# Patient Record
Sex: MALE | Employment: OTHER | URBAN - METROPOLITAN AREA
[De-identification: names, ages, dates, MRNs, and addresses within clinical notes are randomized per-mention and may not be internally consistent; named-entity substitution may affect disease eponyms.]

---

## 2019-04-23 ENCOUNTER — APPOINTMENT (OUTPATIENT)
Dept: CT IMAGING | Age: 84
DRG: 682 | End: 2019-04-23
Attending: EMERGENCY MEDICINE
Payer: MEDICARE

## 2019-04-23 ENCOUNTER — APPOINTMENT (OUTPATIENT)
Dept: GENERAL RADIOLOGY | Age: 84
DRG: 682 | End: 2019-04-23
Attending: FAMILY MEDICINE
Payer: MEDICARE

## 2019-04-23 ENCOUNTER — HOSPITAL ENCOUNTER (INPATIENT)
Age: 84
LOS: 1 days | Discharge: HOME | DRG: 682 | End: 2019-04-25
Attending: EMERGENCY MEDICINE | Admitting: FAMILY MEDICINE
Payer: MEDICARE

## 2019-04-23 DIAGNOSIS — Z72.0 TOBACCO USE: ICD-10-CM

## 2019-04-23 DIAGNOSIS — T07.XXXA MULTIPLE CONTUSIONS: Primary | ICD-10-CM

## 2019-04-23 DIAGNOSIS — Z71.89 GOALS OF CARE, COUNSELING/DISCUSSION: ICD-10-CM

## 2019-04-23 DIAGNOSIS — N17.9 AKI (ACUTE KIDNEY INJURY) (HCC): ICD-10-CM

## 2019-04-23 DIAGNOSIS — N39.0 URINARY TRACT INFECTION WITHOUT HEMATURIA, SITE UNSPECIFIED: ICD-10-CM

## 2019-04-23 DIAGNOSIS — R41.0 CONFUSION: ICD-10-CM

## 2019-04-23 DIAGNOSIS — R41.89 COGNITIVE DECLINE: ICD-10-CM

## 2019-04-23 DIAGNOSIS — G93.40 ACUTE ENCEPHALOPATHY: ICD-10-CM

## 2019-04-23 LAB
ALBUMIN SERPL-MCNC: 3.4 G/DL (ref 3.5–5)
ALBUMIN/GLOB SERPL: 0.9 {RATIO} (ref 1.1–2.2)
ALP SERPL-CCNC: 116 U/L (ref 45–117)
ALT SERPL-CCNC: 19 U/L (ref 12–78)
ANION GAP SERPL CALC-SCNC: 5 MMOL/L (ref 5–15)
APPEARANCE UR: ABNORMAL
AST SERPL-CCNC: 22 U/L (ref 15–37)
BACTERIA URNS QL MICRO: NEGATIVE /HPF
BASOPHILS # BLD: 0.1 K/UL (ref 0–0.1)
BASOPHILS NFR BLD: 1 % (ref 0–1)
BILIRUB SERPL-MCNC: 0.4 MG/DL (ref 0.2–1)
BILIRUB UR QL: NEGATIVE
BUN SERPL-MCNC: 56 MG/DL (ref 6–20)
BUN/CREAT SERPL: 31 (ref 12–20)
CALCIUM SERPL-MCNC: 8.9 MG/DL (ref 8.5–10.1)
CHLORIDE SERPL-SCNC: 107 MMOL/L (ref 97–108)
CO2 SERPL-SCNC: 25 MMOL/L (ref 21–32)
COLOR UR: ABNORMAL
COMMENT, HOLDF: NORMAL
CREAT SERPL-MCNC: 1.79 MG/DL (ref 0.7–1.3)
DIFFERENTIAL METHOD BLD: ABNORMAL
EOSINOPHIL # BLD: 0.2 K/UL (ref 0–0.4)
EOSINOPHIL NFR BLD: 2 % (ref 0–7)
EPITH CASTS URNS QL MICRO: ABNORMAL /LPF
ERYTHROCYTE [DISTWIDTH] IN BLOOD BY AUTOMATED COUNT: 13.8 % (ref 11.5–14.5)
GLOBULIN SER CALC-MCNC: 4 G/DL (ref 2–4)
GLUCOSE SERPL-MCNC: 88 MG/DL (ref 65–100)
GLUCOSE UR STRIP.AUTO-MCNC: NEGATIVE MG/DL
HCT VFR BLD AUTO: 38 % (ref 36.6–50.3)
HGB BLD-MCNC: 12.1 G/DL (ref 12.1–17)
HGB UR QL STRIP: ABNORMAL
IMM GRANULOCYTES # BLD AUTO: 0 K/UL (ref 0–0.04)
IMM GRANULOCYTES NFR BLD AUTO: 0 % (ref 0–0.5)
KETONES UR QL STRIP.AUTO: NEGATIVE MG/DL
LEUKOCYTE ESTERASE UR QL STRIP.AUTO: ABNORMAL
LYMPHOCYTES # BLD: 1.6 K/UL (ref 0.8–3.5)
LYMPHOCYTES NFR BLD: 17 % (ref 12–49)
MCH RBC QN AUTO: 32 PG (ref 26–34)
MCHC RBC AUTO-ENTMCNC: 31.8 G/DL (ref 30–36.5)
MCV RBC AUTO: 100.5 FL (ref 80–99)
MONOCYTES # BLD: 0.9 K/UL (ref 0–1)
MONOCYTES NFR BLD: 9 % (ref 5–13)
NEUTS SEG # BLD: 7 K/UL (ref 1.8–8)
NEUTS SEG NFR BLD: 71 % (ref 32–75)
NITRITE UR QL STRIP.AUTO: NEGATIVE
NRBC # BLD: 0 K/UL (ref 0–0.01)
NRBC BLD-RTO: 0 PER 100 WBC
OTHER,OTHU: ABNORMAL
PH UR STRIP: 6 [PH] (ref 5–8)
PLATELET # BLD AUTO: 224 K/UL (ref 150–400)
PMV BLD AUTO: 10.5 FL (ref 8.9–12.9)
POTASSIUM SERPL-SCNC: 5.2 MMOL/L (ref 3.5–5.1)
PROT SERPL-MCNC: 7.4 G/DL (ref 6.4–8.2)
PROT UR STRIP-MCNC: 30 MG/DL
RBC # BLD AUTO: 3.78 M/UL (ref 4.1–5.7)
RBC #/AREA URNS HPF: ABNORMAL /HPF (ref 0–5)
SAMPLES BEING HELD,HOLD: NORMAL
SODIUM SERPL-SCNC: 137 MMOL/L (ref 136–145)
SP GR UR REFRACTOMETRY: 1.01 (ref 1–1.03)
UR CULT HOLD, URHOLD: NORMAL
UROBILINOGEN UR QL STRIP.AUTO: 0.2 EU/DL (ref 0.2–1)
WBC # BLD AUTO: 9.8 K/UL (ref 4.1–11.1)
WBC URNS QL MICRO: >100 /HPF (ref 0–4)

## 2019-04-23 PROCEDURE — 93005 ELECTROCARDIOGRAM TRACING: CPT

## 2019-04-23 PROCEDURE — 81001 URINALYSIS AUTO W/SCOPE: CPT

## 2019-04-23 PROCEDURE — 87086 URINE CULTURE/COLONY COUNT: CPT

## 2019-04-23 PROCEDURE — 99285 EMERGENCY DEPT VISIT HI MDM: CPT

## 2019-04-23 PROCEDURE — 80053 COMPREHEN METABOLIC PANEL: CPT

## 2019-04-23 PROCEDURE — 70450 CT HEAD/BRAIN W/O DYE: CPT

## 2019-04-23 PROCEDURE — 36415 COLL VENOUS BLD VENIPUNCTURE: CPT

## 2019-04-23 PROCEDURE — 85025 COMPLETE CBC W/AUTO DIFF WBC: CPT

## 2019-04-24 ENCOUNTER — APPOINTMENT (OUTPATIENT)
Dept: GENERAL RADIOLOGY | Age: 84
DRG: 682 | End: 2019-04-24
Attending: FAMILY MEDICINE
Payer: MEDICARE

## 2019-04-24 PROBLEM — N17.9 AKI (ACUTE KIDNEY INJURY) (HCC): Status: ACTIVE | Noted: 2019-04-24

## 2019-04-24 PROBLEM — R41.82 ALTERED MENTAL STATUS: Status: ACTIVE | Noted: 2019-04-24

## 2019-04-24 LAB
AMMONIA PLAS-SCNC: 18 UMOL/L
ATRIAL RATE: 59 BPM
CALCULATED R AXIS, ECG10: -15 DEGREES
CALCULATED T AXIS, ECG11: 48 DEGREES
DIAGNOSIS, 93000: NORMAL
MAGNESIUM SERPL-MCNC: 2.4 MG/DL (ref 1.6–2.4)
PHOSPHATE SERPL-MCNC: 3.4 MG/DL (ref 2.6–4.7)
Q-T INTERVAL, ECG07: 362 MS
QRS DURATION, ECG06: 80 MS
QTC CALCULATION (BEZET), ECG08: 415 MS
VENTRICULAR RATE, ECG03: 79 BPM

## 2019-04-24 PROCEDURE — 84100 ASSAY OF PHOSPHORUS: CPT

## 2019-04-24 PROCEDURE — 36415 COLL VENOUS BLD VENIPUNCTURE: CPT

## 2019-04-24 PROCEDURE — 83735 ASSAY OF MAGNESIUM: CPT

## 2019-04-24 PROCEDURE — 76450000000

## 2019-04-24 PROCEDURE — 74011250636 HC RX REV CODE- 250/636: Performed by: HOSPITALIST

## 2019-04-24 PROCEDURE — 82140 ASSAY OF AMMONIA: CPT

## 2019-04-24 PROCEDURE — 96365 THER/PROPH/DIAG IV INF INIT: CPT

## 2019-04-24 PROCEDURE — 94761 N-INVAS EAR/PLS OXIMETRY MLT: CPT

## 2019-04-24 PROCEDURE — 97166 OT EVAL MOD COMPLEX 45 MIN: CPT

## 2019-04-24 PROCEDURE — 71045 X-RAY EXAM CHEST 1 VIEW: CPT

## 2019-04-24 PROCEDURE — 97161 PT EVAL LOW COMPLEX 20 MIN: CPT

## 2019-04-24 PROCEDURE — 74011000258 HC RX REV CODE- 258: Performed by: EMERGENCY MEDICINE

## 2019-04-24 PROCEDURE — 74011250636 HC RX REV CODE- 250/636: Performed by: FAMILY MEDICINE

## 2019-04-24 PROCEDURE — 74011250636 HC RX REV CODE- 250/636: Performed by: EMERGENCY MEDICINE

## 2019-04-24 PROCEDURE — 97535 SELF CARE MNGMENT TRAINING: CPT

## 2019-04-24 PROCEDURE — 65270000032 HC RM SEMIPRIVATE

## 2019-04-24 RX ORDER — NICOTINE 7MG/24HR
1 PATCH, TRANSDERMAL 24 HOURS TRANSDERMAL DAILY
Status: DISCONTINUED | OUTPATIENT
Start: 2019-04-24 | End: 2019-04-25 | Stop reason: HOSPADM

## 2019-04-24 RX ORDER — SODIUM CHLORIDE 9 MG/ML
75 INJECTION, SOLUTION INTRAVENOUS CONTINUOUS
Status: DISPENSED | OUTPATIENT
Start: 2019-04-24 | End: 2019-04-25

## 2019-04-24 RX ORDER — SODIUM CHLORIDE 0.9 % (FLUSH) 0.9 %
5-40 SYRINGE (ML) INJECTION AS NEEDED
Status: DISCONTINUED | OUTPATIENT
Start: 2019-04-24 | End: 2019-04-25 | Stop reason: HOSPADM

## 2019-04-24 RX ORDER — CEFTRIAXONE 2 G/1
INJECTION, POWDER, FOR SOLUTION INTRAMUSCULAR; INTRAVENOUS
Status: DISCONTINUED
Start: 2019-04-24 | End: 2019-04-24

## 2019-04-24 RX ORDER — SODIUM CHLORIDE 0.9 % (FLUSH) 0.9 %
5-40 SYRINGE (ML) INJECTION EVERY 8 HOURS
Status: DISCONTINUED | OUTPATIENT
Start: 2019-04-24 | End: 2019-04-25 | Stop reason: HOSPADM

## 2019-04-24 RX ORDER — ACETAMINOPHEN 325 MG/1
650 TABLET ORAL
Status: DISCONTINUED | OUTPATIENT
Start: 2019-04-24 | End: 2019-04-25 | Stop reason: HOSPADM

## 2019-04-24 RX ORDER — AMOXICILLIN 250 MG
1 CAPSULE ORAL DAILY
Status: DISCONTINUED | OUTPATIENT
Start: 2019-04-24 | End: 2019-04-25 | Stop reason: HOSPADM

## 2019-04-24 RX ORDER — SODIUM CHLORIDE 900 MG/100ML
INJECTION INTRAVENOUS
Status: DISCONTINUED
Start: 2019-04-24 | End: 2019-04-24

## 2019-04-24 RX ADMIN — SODIUM CHLORIDE 500 ML: 900 INJECTION, SOLUTION INTRAVENOUS at 04:31

## 2019-04-24 RX ADMIN — CEFTRIAXONE SODIUM 2 G: 2 INJECTION, POWDER, FOR SOLUTION INTRAMUSCULAR; INTRAVENOUS at 01:51

## 2019-04-24 RX ADMIN — SODIUM CHLORIDE 1000 ML: 900 INJECTION, SOLUTION INTRAVENOUS at 01:46

## 2019-04-24 RX ADMIN — SODIUM CHLORIDE 75 ML/HR: 900 INJECTION, SOLUTION INTRAVENOUS at 10:34

## 2019-04-24 RX ADMIN — SODIUM CHLORIDE 75 ML/HR: 900 INJECTION, SOLUTION INTRAVENOUS at 23:12

## 2019-04-24 RX ADMIN — Medication 10 ML: at 22:04

## 2019-04-24 NOTE — ED TRIAGE NOTES
Pt arrives via EMS after a witnessed GLF and altered mental status. Prior to the fall patient drove to a house that was not his. PT believed he lived there and appeared confused. PT then fell, no LOC and has lacerations on his elbows and hands. Pt does not complain of any pain.

## 2019-04-24 NOTE — ED PROVIDER NOTES
80 y.o. male with no past medical records presents via EMS to the ED with cc of AMS. Per EMS, the pt drove to an unknown house which he believed to be his house, and was trying to get in the house when he tripped and fell on the porch. On EMS arrival, the pt appeared to be confused as he thought he was at his own house. Patient denies hitting his head or LOC, but notes that he sustained lacerations on his right hand and his right elbow. Per EMS, patient had an episode of incontinence. Pt does not have any complaints at this time and would like to go home. There are no other acute medical concerns at this time. Full history, physical exam, and ROS unable to be obtained due to:  confusion. PCP: Army Ildefonso MD 
 
Note written by Tony Trujillo, as dictated by Lyndsay Natarajan MD 9:18 PM 
 
The history is provided by the patient and the EMS personnel. No  was used. History reviewed. No pertinent past medical history. History reviewed. No pertinent surgical history. History reviewed. No pertinent family history. Social History Socioeconomic History  Marital status: Not on file Spouse name: Not on file  Number of children: Not on file  Years of education: Not on file  Highest education level: Not on file Occupational History  Not on file Social Needs  Financial resource strain: Not on file  Food insecurity:  
  Worry: Not on file Inability: Not on file  Transportation needs:  
  Medical: Not on file Non-medical: Not on file Tobacco Use  Smoking status: Not on file Substance and Sexual Activity  Alcohol use: Not on file  Drug use: Not on file  Sexual activity: Not on file Lifestyle  Physical activity:  
  Days per week: Not on file Minutes per session: Not on file  Stress: Not on file Relationships  Social connections:  
  Talks on phone: Not on file Gets together: Not on file Attends [employer] service: Not on file Active member of club or organization: Not on file Attends meetings of clubs or organizations: Not on file Relationship status: Not on file  Intimate partner violence:  
  Fear of current or ex partner: Not on file Emotionally abused: Not on file Physically abused: Not on file Forced sexual activity: Not on file Other Topics Concern  Not on file Social History Narrative  Not on file ALLERGIES: Patient has no known allergies. Review of Systems Reason unable to perform ROS: confusion. Vitals:  
 04/23/19 2118 BP: 166/53 Pulse: 76 Resp: 22 Temp: 98.4 °F (36.9 °C) SpO2: 96% Physical Exam  
Constitutional: He appears well-developed and well-nourished. No distress. HENT:  
Head: Normocephalic and atraumatic. Nose: Nose normal.  
Mouth/Throat: Oropharynx is clear and moist.  
Eyes: Pupils are equal, round, and reactive to light. Conjunctivae and EOM are normal. No scleral icterus. Scleral erythema. Neck: Normal range of motion. Neck supple. No JVD present. No tracheal deviation present. No thyromegaly present. No carotid bruits noted. Cardiovascular: Normal rate, regular rhythm, normal heart sounds and intact distal pulses. Exam reveals no gallop and no friction rub. No murmur heard. Pulmonary/Chest: Effort normal and breath sounds normal. No respiratory distress. He has no wheezes. He has no rales. He exhibits no tenderness. Abdominal: Soft. Bowel sounds are normal. He exhibits no distension and no mass. There is no tenderness. There is no rebound and no guarding. Musculoskeletal: Normal range of motion. He exhibits no edema or tenderness. Lymphadenopathy:  
  He has no cervical adenopathy. Neurological: He is alert. He has normal reflexes. No cranial nerve deficit. Coordination normal.  
Confused, altered. No focal neurological deficits. Skin: Skin is warm and dry. No rash noted. No erythema. Skin abrasion on both elbows. Flushed. Psychiatric: He has a normal mood and affect. His behavior is normal. Judgment and thought content normal.  
Altered mental status, confused Nursing note and vitals reviewed. Note written by Tony Espinosa, as dictated by Haylee Gonzalez MD 9:18 PM 
 
MDM Number of Diagnoses or Management Options Amount and/or Complexity of Data Reviewed Clinical lab tests: ordered and reviewed Tests in the radiology section of CPT®: ordered and reviewed Decide to obtain previous medical records or to obtain history from someone other than the patient: yes Review and summarize past medical records: yes Independent visualization of images, tracings, or specimens: yes Risk of Complications, Morbidity, and/or Mortality Presenting problems: high Diagnostic procedures: high Management options: high Patient Progress Patient progress: stable Procedures PROGRESS NOTE: 
10:41 PM 
Attempting to contact the pt's son. Patient's lab and CT are normal. Wounds are dressed. Attempting to find family members. Unclear as to the patient's baseline status. If this is baseline presentation, he could potentially be discharged. If new, he will likely require admission. Will turn patient over to Dr. Barbi Manriquez pending disposition

## 2019-04-24 NOTE — PROGRESS NOTES
Reason for Admission: Altered Mental Status, Acute Kidney Injury RRAT Score: 5- LOW Plan for utilizing home health: TBD Current Advanced Directive/Advance Care Plan: No AMD on file. Pt reports he has AMD at home. CM requested permission to contact son to bring in copy; pt agreeable. CM contacted Nancy kasper, to follow-up on AMD. SonNancy, reported pt does have a will; CM encouraged son to bring copy for chart. Likelihood of Readmission: LOW Transition of Care Plan: CM met with pt at bedside to discuss CM role and to assess pt needs. CM verified demographic information including insurance and emergency contact. Pt currently lives with son in a private residence. Pt reports sonNancy, will provide transportation home after discharge. Pt reports no DME use at home. Pt reports he does receive some assistance from son with ADLs. Pt reports he does not take any medication at this time but if he needed medication a local pharmacy in [address] would be fine. Pt does not have a PCP at this time. CM offered to assist with setting up a new patient appointment with a [facility] provider; pt agreeable as long as it is closer to [address]. Pt and son report no concerns for discharge at this time. Care Management Interventions PCP Verified by CM: No 
Palliative Care Criteria Met (RRAT>21 & CHF Dx)?: No 
Mode of Transport at Discharge: Other (see comment)(Nancy Kasper) Transition of Care Consult (CM Consult): Discharge Planning MyChart Signup: No 
Discharge Durable Medical Equipment: No 
Physical Therapy Consult: Yes Occupational Therapy Consult: Yes Speech Therapy Consult: No 
Current Support Network: Own Home, Other Confirm Follow Up Transport: Family Plan discussed with Pt/Family/Caregiver: Yes Discharge Location Discharge Placement: Home(Disposition TBD/subject to change pending care recommendations) Hailey Ortiz RN, BSN 
 Care Management Department

## 2019-04-24 NOTE — PROGRESS NOTES
Problem: Self Care Deficits Care Plan (Adult) Goal: *Acute Goals and Plan of Care (Insert Text) Description Occupational Therapy Goals Initiated 4/24/2019 1. Patient will perform lower body dressing with modified independence within 7 day(s). 2.  Patient will perform bathing with modified independence within 7 day(s). 3.  Patient will perform gathering ADL items high and low 4/4 without LOB with modified independence within 7 day(s). 4.  Patient will perform toilet transfers with modified independence within 7 day(s). 5.  Patient will perform all aspects of toileting with modified independence within 7 day(s). 6. Patient will perform shower transfer, mock PRN, with modified independence within 7 days. Outcome: Not Met OCCUPATIONAL THERAPY EVALUATION Patient: Herve Coelho (14 y.o. male) Date: 4/24/2019 Primary Diagnosis: Altered mental status [R41.82] SANDRA (acute kidney injury) ([facility] [address] [N17.9] Precautions:   Bed Alarm ASSESSMENT : 
Based on the objective data described below, patient presents with modified independence basic ADLs and total A instrumental ADLs prior level of function. Today presents with Setup upper body ADLs, Moderate Assistance lower body ADLs, and Moderate Assistance functional mobility. Barthel Index showing 45% impairment in baseline ability. The following are barriers to ADL independence while in acute care:  
- Cognitive and/or behavioral: orientation, attention to task, command following, processing, sequencing, safety awareness, insight into deficits and short term memory loss - Medical condition: ROM, functional endurance, standing balance and coordination   
- Other:    
 
Patient will benefit from skilled acute intervention to address the above impairments. Patient?s rehabilitation potential is considered to be Good Discharge recommendations: Home health (to increase independence and safety) to maintain continuity of environment, routine, and people to maximize patient independence and decrease confusion. If family is in fact not able to assist during mobility, 2* high fall risk, then will need SNF rehab. Friend stating keys to vehicles will be hidden now from patient. Barriers to discharging home, in addition to above listed impairments: history of falls 
total assist driving to follow up medical appointment(s)/groceries/obtain medication 
entry and exit into the home 
level of physical assist required to maintain patient safety. Equipment recommendations for successful discharge (if) home: gait belt PLAN : 
Recommendations and Planned Interventions: self care training, functional mobility training, therapeutic exercise, balance training, therapeutic activities, endurance activities, patient education, home safety training and family training/education Frequency/Duration: Patient will be followed by occupational therapy 5 times a week to address goals (2* plan is to discharge home so need patient as independent as possible prior to discharge) Recommend with nursing patient to complete as able in order to maintain strength, endurance and independence: ADLs with supervision/setup, OOB to chair 3x/day and mobilizing to the bathroom for toileting with 1 assist. Thank you for your assistance. SUBJECTIVE:  
Patient stated ? Just let me go home. ? OBJECTIVE DATA SUMMARY:  
HISTORY:  
Past Medical History:  
Diagnosis Date Altered awareness, transient Cancer ([facility] [address] Left breast Ca, Bladder Ca Past Surgical History:  
Procedure Laterality Date HX OTHER SURGICAL Tumor resection from left breast and bladder Prior Level of Function/Environment/Context: Patient report and then friend in room confirmed and clarified the details: modified independence basic ADLs, total A instrumental ADLs.  Friend comes to visit 3 days a week to bring food and visit. His residence is a mother-in-law suite in which family is present. Friend stated patient driving, then went to what he thought was his home, knocked on door and woman stated it in fact was not his home, patient fell in her yard, 911 called. Occupations in which the patient is/was successful, what are the barriers preventing that success:  
Performance Patterns (routines, roles, habits, and rituals): Smoking his pipe, 3 glasses of whiskey mixed with water daily. Used to do yardwork. Personal Interests and/or values:  
Expanded or extensive additional review of patient history:  
 
Home Situation Home Environment: Private residence Living Alone: No 
Support Systems: Family member(s) Patient Expects to be Discharged to[de-identified] Private residence Tub or Shower Type: Shower Hand dominance: Right EXAMINATION OF PERFORMANCE DEFICITS: 
Cognitive/Behavioral Status: 
Neurologic State: Alert Orientation Level: Oriented to person;Disoriented to situation;Disoriented to time;Disoriented to place Cognition: Impaired decision making;Decreased command following;Decreased attention/concentration;Memory loss Perception: Appears intact Perseveration: No perseveration noted Safety/Judgement: Decreased insight into deficits; Decreased awareness of need for safety;Decreased awareness of need for assistance;Decreased awareness of environment Provided patient with 5 words, recalled 2/5 immediate, 0/5 1 min later, 1/5 with category cue, 0/4 with two options presented. Unable to complete MOCA at this time. Skin: abrasions and skin tears throughout, mainly B elbows and forearms Edema: intact Hearing: 
  
 
Vision/Perceptual:   
    
    
    
  
    
Acuity: Impaired near vision(baseline) Corrective Lenses: Reading glasses Range of Motion: 
Shoulder flexion 120*, elbows-digits WDL AROM: Generally decreased, functional 
  
  
  
  
  
  
  
Strength: 
-3/5 shoulder flexion, elbows=digits +3/5 Strength: Generally decreased, functional 
  
  
  
  
Coordination: 
Coordination: Generally decreased, functional(when walking) Fine Motor Skills-Upper: Left Intact; Right Intact Gross Motor Skills-Upper: Left Intact; Right Intact Tone & Sensation: 
Tone: Normal 
Sensation: Intact Balance: 
Sitting: Intact; Without support Standing: Impaired; Without support Standing - Static: Fair(intermittently one hand on supportive surface) Standing - Dynamic : Poor(moderate A, requiring physical A, wall support) Functional Mobility and Transfers for ADLs: 
Bed Mobility: 
Supine to Sit: Moderate assistance(exit R side of bed as at home, cues complete on own, physical A trunk and one tactile cue LEs to edge of bed instead of up in the air) Scooting: Supervision Transfers: 
Sit to Stand: Minimum assistance Stand to Sit: Minimum assistance Bed to Chair: Minimum assistance Bathroom Mobility: Minimum assistance Toilet Transfer : Minimum assistance Shower Transfer: Total assistance(not safe at this time 2* standing balance) ADL Assessment: 
Feeding: Supervision(cues time of day so eating correct meal) Oral Facial Hygiene/Grooming: Moderate assistance(cues for sequencing, task at hand, standing balance) Bathing: Moderate assistance(2* standing balance) Upper Body Dressing: Supervision Lower Body Dressing: Moderate assistance(2* standing balance donning & gathering) Toileting: Moderate assistance(standing balance, 1 hand on stable surface as well) standing to urinate, grab bar use. Brought patient a urinal.  
  
 
  
  
ADL Intervention and task modifications: 
  
 
  
 
  
 
  
 
  
 
  
 
  
 
Cognitive Retraining Safety/Judgement: Decreased insight into deficits; Decreased awareness of need for safety;Decreased awareness of need for assistance;Decreased awareness of environment Therapeutic Exercise: 
  
Functional Measure: 
Barthel Index: 
Bathin Bladder: 10 Bowels: 10 
 Groomin Dressin Feeding: 10 Mobility: 5 Stairs: 0 Toilet Use: 5 Transfer (Bed to Chair and Back): 10 Total: 55/100 Percentage of impairment  
0% 1-19% 20-39% 40-59% 60-79% 80-99% 100% Barthel Score 0-100 100 99-80 79-60 59-40 20-39 1-19 
 0 The Barthel ADL Index: Guidelines 1. The index should be used as a record of what a patient does, not as a record of what a patient could do. 2. The main aim is to establish degree of independence from any help, physical or verbal, however minor and for whatever reason. 3. The need for supervision renders the patient not independent. 4. A patient's performance should be established using the best available evidence. Asking the patient, friends/relatives and nurses are the usual sources, but direct observation and common sense are also important. However direct testing is not needed. 5. Usually the patient's performance over the preceding 24-48 hours is important, but occasionally longer periods will be relevant. 6. Middle categories imply that the patient supplies over 50 per cent of the effort. 7. Use of aids to be independent is allowed. Eliud Hall., Barthel, DSILVESTRE. (0608). Functional evaluation: the Barthel Index. [address] [facility] (14)2. PIERO Fermin, Maureen [facility]., Suzanna Blackwell., [place], [address] [place] [address] (). Measuring the change indisability after inpatient rehabilitation; comparison of the responsiveness of the Barthel Index and Functional [place] Measure. Journal of Neurology, Neurosurgery, and Psychiatry, 66(4), 871-567. Chong King, [place], ROSIE Alejandro, & BRIANA GutierrezA. (2004.) Assessment of post-stroke quality of life in cost-effectiveness studies: The usefulness of the Barthel Index and the EuroQoL-5D. [facility], 13, 405-61 Activity Tolerance:  
Fair Please refer to the flowsheet for vital signs taken during this treatment. After treatment patient left:  
Up in chair Bed alarm/tab alert on 
Caregiver at bedside Call light within reach RN notified COMMUNICATION/EDUCATION:  
The patient?s plan of care was discussed with: Registered Nurse. Home safety education was provided and the patient/caregiver indicated understanding., Patient/family have participated as able in goal setting and plan of care. and Patient/family agree to work toward stated goals and plan of care. This patient?s plan of care is appropriate for delegation to [facility] [place]. Thank you for this referral. 
Rosanna Cockayne Time Calculation: 35 mins

## 2019-04-24 NOTE — PROGRESS NOTES
Hospitalist Progress Note Jennifer Guerra MD 
Answering service: [phone] OR [phone] from in house phone Date of Service:  2019 NAME:  Yunior Gutierrez :  3/15/1923 MRN:  [id number] Admission Summary: A 61-year-old white male with past medical history of BPH, status post transurethral resection of the prostate; presented to the emergency department via EMS with reported altered mental status, status post fall. The patient is a limited historian. As such, majority of the history was obtained from review of ED and electronic medical records. Per the collective report, the patient had fallen yesterday. The patient recalled that he fell on to the ground outside. He was able to mobilize off of the ground unassisted and walked to his house Interval history / Subjective:  
 Pt seen in ER 16 he is AAOx 0 demented plesently Assessment & Plan: 1. Acute kidney injury with dehydration.   
- Order intravenous fluid hydration resuscitation. Place on strict I's and O's. 
- avoid nephrotoxins 2. Metabolic encephalopathy in a demented pt unknown baseline 
- supportive care treat infection 3. Recurrent falls. Plans as noted above. 4.  Multiple skin tear,wound care on abx 5. Gait abnormality with general debility. Place on fall precaution. Consider physical therapy. 6.  Abnormal labs, with hemolyzed potassium - repeat 7. UTI POA - treat with ceftriaxone , follow up cultures 
  
 
 
Code status: full pall care consulted DVT prophylaxis: SCD Care Plan discussed with: Patient/Family and Nurse Disposition: TBD Hospital Problems  Date Reviewed: 2019 Codes Class Noted POA * (Principal) Altered mental status ICD-10-CM: R41.82 
ICD-9-CM: 780.97  2019 Unknown SANDRA (acute kidney injury) ([facility] [address] ICD-10-CM: N17.9 ICD-9-CM: 584.9  2019 Unknown Review of Systems:  
Review of systems not obtained due to patient factors. Vital Signs:  
 Last 24hrs VS reviewed since prior progress note. Most recent are: 
Visit Vitals /63 Pulse 75 Temp 98.4 °F (36.9 °C) Resp 20 SpO2 96% No intake or output data in the 24 hours ending 04/24/19 0901 Physical Examination:  
 
 
     
Constitutional:  No acute distress, cooperative, ENT:  Oral mucous moist  
Resp:  CTA bilaterally. No wheezing/rhonchi/rales. CV:  Regular rhythm, normal rate, GI:  Soft, non distended, non tender. BS+ Musculoskeletal:  No edema, warm, 2+ pulses throughout Neurologic:  Moves all extremities. AAOx0, Psych:  Good insight, Not anxious nor agitated. Data Review:  
 I personally reviewed  Image and LABS Ct Head Wo Cont Result Date: 4/23/2019 IMPRESSION: No change, no acute findings. Xr Chest [place] Result Date: 4/24/2019 
 impression: No acute infiltrate. Labs:  
 
Recent Labs  
  04/23/19 2125 WBC 9.8 HGB 12.1 HCT 38.0  
 Recent Labs  
  04/24/19 
0432 04/23/19 2125 NA  --  137 K  --  5.2*  
CL  --  107 CO2  --  25 BUN  --  56* CREA  --  1.79* GLU  --  88  
CA  --  8.9 MG 2.4  --   
PHOS 3.4  --   
 
Recent Labs  
  04/23/19 2125 SGOT 22 ALT 19  
 TBILI 0.4 TP 7.4 ALB 3.4*  
GLOB 4.0 No results for input(s): INR, PTP, APTT in the last 72 hours. No lab exists for component: INREXT No results for input(s): FE, TIBC, PSAT, FERR in the last 72 hours. No results found for: FOL, RBCF No results for input(s): PH, PCO2, PO2 in the last 72 hours. No results for input(s): CPK, CKNDX, TROIQ in the last 72 hours. No lab exists for component: CPKMB No results found for: CHOL, CHOLX, CHLST, CHOLV, HDL, LDL, LDLC, DLDLP, TGLX, TRIGL, TRIGP, CHHD, CHHDX No results found for: [facility] Lab Results Component Value Date/Time  Color YELLOW/STRAW 04/23/2019 10:49 PM  
 Appearance TURBID (A) 04/23/2019 10:49 PM  
 Specific gravity 1.011 04/23/2019 10:49 PM  
 pH (UA) 6.0 04/23/2019 10:49 PM  
 Protein 30 (A) 04/23/2019 10:49 PM  
 Glucose NEGATIVE  04/23/2019 10:49 PM  
 Ketone NEGATIVE  04/23/2019 10:49 PM  
 Bilirubin NEGATIVE  04/23/2019 10:49 PM  
 Urobilinogen 0.2 04/23/2019 10:49 PM  
 Nitrites NEGATIVE  04/23/2019 10:49 PM  
 Leukocyte Esterase LARGE (A) 04/23/2019 10:49 PM  
 Epithelial cells MODERATE (A) 04/23/2019 10:49 PM  
 Bacteria NEGATIVE  04/23/2019 10:49 PM  
 WBC >100 (H) 04/23/2019 10:49 PM  
 RBC 0-5 04/23/2019 10:49 PM  
 
 
 
Medications Reviewed:  
 
Current Facility-Administered Medications Medication Dose Route Frequency  sodium chloride (NS) flush 5-40 mL  5-40 mL IntraVENous Q8H  
 sodium chloride (NS) flush 5-40 mL  5-40 mL IntraVENous PRN  
 [START ON 4/25/2019] cefTRIAXone (ROCEPHIN) 1 g in 0.9% sodium chloride (MBP/ADV) 50 mL  1 g IntraVENous Q24H  
 0.9% sodium chloride infusion  75 mL/hr IntraVENous CONTINUOUS  
 senna-docusate (PERICOLACE) 8.6-50 mg per tablet 1 Tab  1 Tab Oral DAILY  acetaminophen (TYLENOL) tablet 650 mg  650 mg Oral Q4H PRN Current Outpatient Medications Medication Sig  
 docusate sodium (STOOL SOFTENER PO) Take  by mouth.  
 
______________________________________________________________________ EXPECTED LENGTH OF STAY: - - - 
ACTUAL LENGTH OF STAY:          0 Jesus Waldrop MD

## 2019-04-24 NOTE — PROGRESS NOTES
Admission Medication Reconciliation: 
Information obtained from:  Patient/RxQuery Comments/Recommendations: Updated PTA meds/reviewed patient's allergies. 1)  Spoke with patient. He reports that he does not taking ANY medications at home. 2)  Medication changes (since last review): 
 
Removed 
- docusate Allergies:  Patient has no known allergies. Significant PMH/Disease States:  
Past Medical History:  
Diagnosis Date Altered awareness, transient Cancer ([facility] [address] Left breast Ca, Bladder Ca Chief Complaint for this Admission: Chief Complaint Patient presents with Fall Altered mental status Prior to Admission Medications:  
None Pedrito Lara, PharmD
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
## 2019-04-24 NOTE — ED NOTES
Multiple attempts made by EMS to call family to receive history on patient. Waiting for a call back We are uncertain about any allergy medication and history at this time
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
## 2019-04-24 NOTE — PROGRESS NOTES
Spiritual Care Assessment/Progress Note [address] 
 
 
NAME: Evi Castelan      MRN: [id number] AGE: 80 y.o. SEX: male [employer] Affiliation: Other Language: [place] 4/24/2019     Total Time (in minutes): 9 Spiritual Assessment begun in [facility] through conversation with: 
  
    [x]Patient        [x] Family    [] Friend(s) Reason for Consult: Palliative Care, Initial/Spiritual Assessment Spiritual beliefs: (Please include comment if needed) 
   [] Identifies with a luciano tradition:     
   [] Supported by a luciano community:        
   [] Claims no spiritual orientation:       
   [] Seeking spiritual identity:            
   [] Adheres to an individual form of spirituality:       
   [x] Not able to assess: did not indicate Identified resources for coping:  
   [] Prayer                           
   [] Music                  [] Guided Imagery 
   [] Family/friends                 [] Pet visits [] Devotional reading                         [x] Unknown 
   [] Other Interventions offered during this visit: (See comments for more details) Patient Interventions: Initial/Spiritual assessment, patient floor Family/Friend(s): Initial Assessment Plan of Care: 
 
 [] Support spiritual and/or cultural needs  
 [] Support AMD and/or advance care planning process    
 [] Support grieving process 
 [] Coordinate Rites and/or Rituals  
 [] Coordination with community clergy 
 [x] No spiritual needs identified at this time 
 [] Detailed Plan of Care below (See Comments)  [] Make referral to Music Therapy 
[] Make referral to Pet Therapy    
[] Make referral to Addiction services 
[] Make referral to [facility] 
[] Make referral to Spiritual Care Partner 
[] No future visits requested       
[] Follow up visits as needed Comments: Visited with Mr. Maritza Blank and his son for initial spiritual assessment. Pt was in good spirits and expressed to be coping well; he shared that he is happy and he smiled throughout my visit. He expressed his desire and hope to be discharged soon. Explored any worries or concerns with pt's son, who shared of reasons for pt's hospitalization. No specific spiritual needs expressed at this time. Cheryl Porter, Palliative
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
## 2019-04-24 NOTE — ED NOTES
Bedside shift change report given to Rahel (oncoming nurse) by Crow Acevedo (offgoing nurse). Report included the following information SBAR, ED Summary and MAR.

## 2019-04-24 NOTE — ACP (ADVANCE CARE PLANNING)
Adv Care Plan Note At this time patient does not have decision making capacity due to delirium and dementia. He has a Living Will on the chart appointing his son Rehana Lowery III as MPOA. Dilia Mason (son) tells me his dad has a DDNR at home and will bring it to hospital tomorrow.

## 2019-04-24 NOTE — WOUND CARE
WOCN Note:  
 
New consult placed by RN for: skin abrasions to bilateral elbows. Chart shows: 
Admitted for AMS / pleasantly confused and took a fall on a porch of a home he was trying to get into and it was not patients home.; history of BPH and transurethral prostatectomy. WBC = 9.8 On = 4-23-19 Admitted from home: lives with son. Assessment: POA: abrasions to left and right elbow. 
-left: 3.5cm x 0.7cm x < 0.1cm / 100% red beginning to scab with noted erythema surrounding. 
-right: moon shaped abrasion with scab: 1.0cm x 0.4cm x 0.0cm. Dry wound with erythema surrounding. Patient is alert but not oriented at times, communicative, continent and mobile with assistance. Needs guidance with ambulation and moving about room. Patient can be incontinent at times. Bed: Versa Care Bed. Diet: cardiac regular Patient reports no pain at this time. 2 visitors present in room. Bilateral heels, buttocks and sacral skin intact and without erythema. Heels offloaded on pillows when in bed. Recommendations:   
 
Left elbow: every 3 days, apply new piece of xeroform gauze ( cut to size ) and cover with mepilex border dressing. Minimize layers of linen/pads under patient to optimize support surface. Turn/reposition approximately every 2 hours and offload heels. Manage incontinence / promote continence; Aloe Vesta to buttocks and sacrum daily and as needed with incontinence care. Specialty bed: Versa Care Bed. Discussed above plan with patient and RN: Kenisha. Transition of Care: Plan to follow weekly and as needed while admitted to hospital.  
 
Jsoe OCHOAN RN Wound Care Department Office: [phone] Pager: [phone]

## 2019-04-24 NOTE — H&P
[address] HISTORY AND PHYSICAL Name:  Herman Cristobal 
MR#:  [id number] :  03/15/1923 ACCOUNT #:  [de-identified] ADMIT DATE:  2019 CHIEF COMPLAINT:  Fall. HISTORY OF PRESENT ILLNESS:  A 51-year-old white male with past medical history of BPH, status post transurethral resection of the prostate; presented to the emergency department via EMS with reported altered mental status, status post fall. The patient is a limited historian. As such, majority of the history was obtained from review of ED and electronic medical records. Per the collective report, the patient had fallen yesterday. The patient recalled that he fell on to the ground outside. He was able to mobilize off of the ground unassisted and walked to his house. He reports he had multiple breakdown and skin abrasions, right elbow and hand pain after the fall. He denied any head or neck trauma. He denied loss of consciousness. The patient reportedly was incontinent at the scene. On EMS arrival at the scene, the patient reportedly had appeared confused. The patient was brought to the [facility] for further evaluation. On arrival, initial recorded vital signs, blood pressure 166/53, heart rate of 76, respiratory rate 22, saturation 95% room air. The patient's labs showed elevated potassium of 5.2, elevated BUN of 56, creatinine 1.79 ( compared to last creatinine of 1.30 on 2010). CT of the head without contrast showed no acute intracranial abnormalities. A 12-lead EKG shows accelerated junctional rhythm of 79 beats per minutes. ED ordered ceftriaxone 2 g IV. No noted infectious process identified. The patient is now seen for admission to the hospitalist service for continued evaluation and treatment.   Currently, he does not complain of any dizziness, lightheadedness, focal weakness, numbness, paresthesias, slurred speech, facial droop, headache, neck pain, back pain, chest pain, abdominal pain, nausea, vomiting, diarrhea, melena, dysuria, hematuria, calf pain, fever, chills or rash. PAST MEDICAL HISTORY:  BPH. PAST SURGICAL HISTORY:  TURP (03/31/2010) MEDICATIONS:  None. ALLERGIES:  NO KNOWN DRUG ALLERGIES;  ADHESIVE TAPES. SOCIAL HISTORY:   No reported smoking, alcohol, or illicit drugs. FAMILY HISTORY:  Unknown regards to heart attacks or strokes. REVIEW OF SYSTEMS:  Pertinent positives were as noted in the HPI, otherwise negative. PHYSICAL EXAMINATION: 
VITAL SIGNS:  Temperature 98.4 degrees Fahrenheit, blood pressure 120/54, heart rate of 65, respiratory rate of 18, and O2 saturations 96% on room air. GENERAL:  Underweight elderly male, in no acute respiratory distress. PSYCH:  The patient is awake, alert and oriented x2 to person and place. Confused in the year, anxious but cooperative. NEUROLOGIC:  GCS of 14 (E4, V4, M6). Spontaneous opening, occasional inappropriate verbal response, and follows commands. Moves extremities x4 with generalized weakness. Sensation is grossly intact with withdrawal to tactile stimuli. No slurred speech or facial droop. HEENT:  Normocephalic or atraumatic. PERRLA. EOMs intact. Sclerae anicteric. Conjunctivae clear. Nares are patent. Pharynx:  Tongue is midline, not edematous. NECK:  Supple without lymphadenopathy, JVD, carotid bruits, or thyromegaly. LYMPHATIC:  Negative for cervical or supraclavicular adenopathy. RESPIRATORY/LUNGS:  Clear to auscultation bilaterally. CVS/HEART:  Regular rate and rhythm. Normal S1 and S2 without murmurs, rubs, or gallops. GI:  Abdomen is soft, nontender, and nondistended. Normoactive bowel sounds. No rebound, guarding, or rigidity. No auscultated abdominal bruits. No palpable abdominal mass. BACK:  No CVA tenderness. No step-off. MUSCULOSKELETAL:  No acute palpable bony deformity. Negative calf tenderness. VASCULAR:  2+ radial with 1+ dorsalis pedis pulse without cyanosis or clubbing. Negative for edema. SKIN:  Warm and dry with poor skin, nail and toenail hygiene. LABORATORY DATA:  Reviewed. Sodium 137, potassium 5.2, chloride 107, CO2 of 25, BUN of 56, creatinine 1.79, glucose 80, anion gap of 5, calcium 8.9, GFR 35, total bilirubin 0.4, total protein is 7.4, albumin of 3.4, ALT of 19, AST of 22, alkaline phosphatase of 116. WBC is 9.8, hemoglobin is 12.1, hematocrit 38.0, platelets 048, and neutrophils 71%. CT of the head without contrast, no acute intracranial abnormality, no acute findings. Chest x-ray portable, results are reviewed and 12-lead EKG results are reviewed. IMPRESSION AND PLAN: 
1. Acute kidney injury with dehydration. Order intravenous fluid hydration resuscitation. Place on strict I's and O's. 
2.  Altered mental status - may be secondary to encephalopathy. During current evaluation, I asked the patient who does he live with and the patient responded by saying \"my father. \"  During the assessment, the patient however became reoriented and noted that he lives alone. Will place the patient on neurovascular checks and fall precautions. The patient has no focal neurological deficits to suggest stroke, however, we will monitor closely. 3.  Recurrent falls. Plans as noted above. 4.  Multiple skin tear, soft tissue contusion of upper and lower extremities. Continue wound care and dressing changes. 5.  Gait abnormality with general debility. Place on fall precaution. Consider physical therapy. 6.  Abnormal labs, with hemolyzed potassium on the last lab. Still awaiting repeat labs this a.m. 
7.  Venous thromboembolism prophylaxis. Sequential compression devices to lower extremities. Mohit Fofana MD [id number] 
D:  04/24/2019 4:41 T:  04/24/2019 7:42 JOB #:  [id number]

## 2019-04-24 NOTE — ROUTINE PROCESS
TRANSFER - OUT REPORT: 
 
Verbal report given to AKIKO Sampson(name) on Monika Ledesma  being transferred to (unit) for routine progression of care Report consisted of patients Situation, Background, Assessment and  
Recommendations(SBAR). Information from the following report(s) SBAR and Kardex was reviewed with the receiving nurse. Lines:  
Peripheral IV 04/24/19 Right Forearm (Active) Site Assessment Clean, dry, & intact 4/24/2019  7:32 AM  
Phlebitis Assessment 0 4/24/2019  7:32 AM  
Infiltration Assessment 0 4/24/2019  7:32 AM  
Dressing Status Clean, dry, & intact 4/24/2019  7:32 AM  
Hub Color/Line Status Pink 4/24/2019  7:32 AM  
  
 
Opportunity for questions and clarification was provided. Patient transported with: 
 [employer]

## 2019-04-25 VITALS
WEIGHT: 138.89 LBS | TEMPERATURE: 97.6 F | HEART RATE: 60 BPM | RESPIRATION RATE: 18 BRPM | DIASTOLIC BLOOD PRESSURE: 70 MMHG | SYSTOLIC BLOOD PRESSURE: 137 MMHG | OXYGEN SATURATION: 96 %

## 2019-04-25 PROBLEM — R41.82 ALTERED MENTAL STATUS: Status: RESOLVED | Noted: 2019-04-24 | Resolved: 2019-04-25

## 2019-04-25 PROBLEM — N17.9 AKI (ACUTE KIDNEY INJURY) (HCC): Status: RESOLVED | Noted: 2019-04-24 | Resolved: 2019-04-25

## 2019-04-25 LAB
ANION GAP SERPL CALC-SCNC: 6 MMOL/L (ref 5–15)
BACTERIA SPEC CULT: NORMAL
BASOPHILS # BLD: 0.1 K/UL (ref 0–0.1)
BASOPHILS NFR BLD: 1 % (ref 0–1)
BUN SERPL-MCNC: 45 MG/DL (ref 6–20)
BUN/CREAT SERPL: 33 (ref 12–20)
CALCIUM SERPL-MCNC: 8.8 MG/DL (ref 8.5–10.1)
CC UR VC: NORMAL
CHLORIDE SERPL-SCNC: 112 MMOL/L (ref 97–108)
CO2 SERPL-SCNC: 22 MMOL/L (ref 21–32)
CREAT SERPL-MCNC: 1.35 MG/DL (ref 0.7–1.3)
DIFFERENTIAL METHOD BLD: ABNORMAL
EOSINOPHIL # BLD: 0.2 K/UL (ref 0–0.4)
EOSINOPHIL NFR BLD: 2 % (ref 0–7)
ERYTHROCYTE [DISTWIDTH] IN BLOOD BY AUTOMATED COUNT: 13.4 % (ref 11.5–14.5)
GLUCOSE SERPL-MCNC: 80 MG/DL (ref 65–100)
HCT VFR BLD AUTO: 36.1 % (ref 36.6–50.3)
HGB BLD-MCNC: 11.8 G/DL (ref 12.1–17)
IMM GRANULOCYTES # BLD AUTO: 0 K/UL (ref 0–0.04)
IMM GRANULOCYTES NFR BLD AUTO: 0 % (ref 0–0.5)
LYMPHOCYTES # BLD: 1.6 K/UL (ref 0.8–3.5)
LYMPHOCYTES NFR BLD: 16 % (ref 12–49)
MCH RBC QN AUTO: 32.8 PG (ref 26–34)
MCHC RBC AUTO-ENTMCNC: 32.7 G/DL (ref 30–36.5)
MCV RBC AUTO: 100.3 FL (ref 80–99)
MONOCYTES # BLD: 1 K/UL (ref 0–1)
MONOCYTES NFR BLD: 11 % (ref 5–13)
NEUTS SEG # BLD: 6.8 K/UL (ref 1.8–8)
NEUTS SEG NFR BLD: 70 % (ref 32–75)
NRBC # BLD: 0 K/UL (ref 0–0.01)
NRBC BLD-RTO: 0 PER 100 WBC
PLATELET # BLD AUTO: 208 K/UL (ref 150–400)
PMV BLD AUTO: 10.4 FL (ref 8.9–12.9)
POTASSIUM SERPL-SCNC: 4.5 MMOL/L (ref 3.5–5.1)
RBC # BLD AUTO: 3.6 M/UL (ref 4.1–5.7)
SERVICE CMNT-IMP: NORMAL
SODIUM SERPL-SCNC: 140 MMOL/L (ref 136–145)
WBC # BLD AUTO: 9.7 K/UL (ref 4.1–11.1)

## 2019-04-25 PROCEDURE — 36415 COLL VENOUS BLD VENIPUNCTURE: CPT

## 2019-04-25 PROCEDURE — 74011000258 HC RX REV CODE- 258: Performed by: HOSPITALIST

## 2019-04-25 PROCEDURE — 74011250636 HC RX REV CODE- 250/636: Performed by: HOSPITALIST

## 2019-04-25 PROCEDURE — 74011250637 HC RX REV CODE- 250/637: Performed by: HOSPITALIST

## 2019-04-25 PROCEDURE — 85025 COMPLETE CBC W/AUTO DIFF WBC: CPT

## 2019-04-25 PROCEDURE — 80048 BASIC METABOLIC PNL TOTAL CA: CPT

## 2019-04-25 PROCEDURE — 74011250637 HC RX REV CODE- 250/637: Performed by: INTERNAL MEDICINE

## 2019-04-25 RX ORDER — CEFUROXIME AXETIL 500 MG/1
500 TABLET ORAL 2 TIMES DAILY
Qty: 12 TAB | Refills: 0 | Status: SHIPPED | OUTPATIENT
Start: 2019-04-25 | End: 2019-05-01

## 2019-04-25 RX ORDER — CEFUROXIME AXETIL 250 MG/1
500 TABLET ORAL 2 TIMES DAILY
Status: DISCONTINUED | OUTPATIENT
Start: 2019-04-25 | End: 2019-04-25 | Stop reason: HOSPADM

## 2019-04-25 RX ORDER — NICOTINE 7MG/24HR
1 PATCH, TRANSDERMAL 24 HOURS TRANSDERMAL DAILY
Qty: 30 PATCH | Refills: 0 | Status: SHIPPED | OUTPATIENT
Start: 2019-04-26 | End: 2019-05-26

## 2019-04-25 RX ADMIN — SENNOSIDES, DOCUSATE SODIUM 1 TABLET: 50; 8.6 TABLET, FILM COATED ORAL at 08:13

## 2019-04-25 RX ADMIN — CEFUROXIME AXETIL 500 MG: 250 TABLET ORAL at 10:34

## 2019-04-25 RX ADMIN — Medication 10 ML: at 07:01

## 2019-04-25 RX ADMIN — CEFTRIAXONE 1 G: 1 INJECTION, POWDER, FOR SOLUTION INTRAMUSCULAR; INTRAVENOUS at 04:22

## 2019-04-25 NOTE — DISCHARGE INSTRUCTIONS
Discharge Instructions       PATIENT ID: Lieutenant Brar  MRN: [id number]   YOB: 1923    DATE OF ADMISSION: 4/23/2019  9:01 PM    DATE OF DISCHARGE: 4/25/2019    PRIMARY CARE PROVIDER: No primary care provider on file. ATTENDING PHYSICIAN: Felipe Luke MD  DISCHARGING PROVIDER: Jose Luis Cuevas MD    To contact this individual call [phone] and ask the  to page. If unavailable ask to be transferred the Adult Hospitalist Department. DISCHARGE DIAGNOSES UTI     CONSULTATIONS: IP CONSULT TO PALLIATIVE CARE - PROVIDER    PROCEDURES/SURGERIES: * No surgery found *    PENDING TEST RESULTS:   At the time of discharge the following test results are still pending:     FOLLOW UP APPOINTMENTS:   Follow-up Information     Follow up With Specialties Details Why Contact Info       patient will need a PCP f/u in 1 week            ADDITIONAL CARE RECOMMENDATIONS:     DIET: regular    ACTIVITY: Activity as tolerated    WOUND CARE:     EQUIPMENT needed:       DISCHARGE MEDICATIONS:   See Medication Reconciliation Form    · It is important that you take the medication exactly as they are prescribed. · Keep your medication in the bottles provided by the pharmacist and keep a list of the medication names, dosages, and times to be taken in your wallet. · Do not take other medications without consulting your doctor. NOTIFY YOUR PHYSICIAN FOR ANY OF THE FOLLOWING:   Fever over 101 degrees for 24 hours. Chest pain, shortness of breath, fever, chills, nausea, vomiting, diarrhea, change in mentation, falling, weakness, bleeding. Severe pain or pain not relieved by medications. Or, any other signs or symptoms that you may have questions about.       DISPOSITION:  x  Home With:   OT  PT  HH  RN       SNF/Inpatient Rehab/LTAC    Independent/assisted living    Hospice   x Other: DNR     CDMP Checked:   Yes x     PROBLEM LIST Updated:  Yes x       Signed:   Jose Luis Cuevas MD  4/25/2019  8:16 AM

## 2019-04-25 NOTE — PROGRESS NOTES
Bedside shift change report given to Gustavo Mckinnon RN (oncoming nurse) by Fredric Severance RN (offgoing nurse). Report included the following information SBAR, Kardex and MAR.

## 2019-04-25 NOTE — PROGRESS NOTES
Problem: Falls - Risk of 
Goal: *Absence of Falls Description Document Salvatore Wolf Fall Risk and appropriate interventions in the flowsheet.  
Outcome: Progressing Towards Goal

## 2019-04-25 NOTE — DISCHARGE SUMMARY
Discharge Summary PATIENT ID: Herve Coelho MRN: [id number] YOB: 1923 DATE OF ADMISSION: 4/23/2019  9:01 PM   
DATE OF DISCHARGE: 4/25/19 PRIMARY CARE PROVIDER: No primary care provider on file. ATTENDING PHYSICIAN: Molly Monte DISCHARGING PROVIDER: Bushra Randle MD   
To contact this individual call [phone] and ask the  to page. If unavailable ask to be transferred the Adult Hospitalist Department. CONSULTATIONS: IP CONSULT TO PALLIATIVE CARE - PROVIDER PROCEDURES/SURGERIES: * No surgery found * [address] COURSE:  
A 60-year-old white male with past medical history of BPH, status post transurethral resection of the prostate; presented to the emergency department via EMS with reported altered mental status, status post fall.  The patient is a limited historian. Gali Gonzales such, majority of the history was obtained from review of ED and electronic medical records.  Per the collective report, the patient had fallen yesterday. Keira Lemus patient recalled that he fell on to the ground outside. Sofía Tolentino was able to mobilize off of the ground unassisted and walked to his house 
  
  
Assessment & Plan:  
  
1.  Acute kidney injury with dehydration.   
- resolving encourage fluid intake 
  
2. Metabolic encephalopathy in a demented pt unknown baseline 
- supportive care treat infection 
  
3.  Recurrent falls.  Plans as noted above. 
  
4.  Multiple skin tear,wound care on abx  
  
5.  Gait abnormality with general debility.  Place on fall precaution.  Consider physical therapy. 
  
6.  Abnormal labs, with hemolyzed potassium - resolved 
  
7.  UTI POA - treat with ceftriaxone ,change to ceftin DISCHARGE DIAGNOSES / PLAN:   
 
1. D/c home with family ADDITIONAL CARE RECOMMENDATIONS:  
 
PENDING TEST RESULTS:  
At the time of discharge the following test results are still pending: FOLLOW UP APPOINTMENTS:   
Follow-up Information Follow up With Specialties Details Why Contact Info  
    patient will need a PCP f/u in 1 week DIET: Regular Diet and Cardiac Diet ACTIVITY: Activity as tolerated WOUND CARE:  
 
EQUIPMENT needed:  
 
 
DISCHARGE MEDICATIONS: 
Current Discharge Medication List  
  
START taking these medications Details  
nicotine (NICODERM CQ) 7 mg/24 hr 1 Patch by TransDERmal route daily for 30 days. Qty: 30 Patch, Refills: 0  
  
cefUROXime (CEFTIN) 500 mg tablet Take 1 Tab by mouth two (2) times a day for 6 days. Qty: 12 Tab, Refills: 0 STOP taking these medications  
  
 docusate sodium (STOOL SOFTENER PO) Comments:  
Reason for Stopping:   
   
  
 
 
 
NOTIFY YOUR PHYSICIAN FOR ANY OF THE FOLLOWING:  
Fever over 101 degrees for 24 hours. Chest pain, shortness of breath, fever, chills, nausea, vomiting, diarrhea, change in mentation, falling, weakness, bleeding. Severe pain or pain not relieved by medications. Or, any other signs or symptoms that you may have questions about. DISPOSITION: 
 x Home With: 
 OT  PT  HH  RN  
  
 Long term SNF/Inpatient Rehab Independent/assisted living Hospice Other:  
 
 
PATIENT CONDITION AT DISCHARGE:  
 
Functional status  
x Poor Deconditioned Independent Cognition Van Jose Forgetful   
x Dementia Catheters/lines (plus indication) Rocha PICC   
 PEG   
x None Code status Full code   
x DNR PHYSICAL EXAMINATION AT DISCHARGE: 
                                            
Constitutional:  No acute distress, cooperative, ENT:  Oral mucous moist  
Resp:  CTA bilaterally. No wheezing/rhonchi/rales. CV:  Regular rhythm, normal rate, GI:  Soft, non distended, non tender. BS+ Musculoskeletal:  No edema, warm, 2+ pulses throughout Neurologic:  Moves all extremities. AAOx0,  
                 
  CHRONIC MEDICAL DIAGNOSES: 
Problem List as of 4/25/2019 Date Reviewed: 4/25/2019 Codes Class Noted - Resolved * (Principal) RESOLVED: Altered mental status ICD-10-CM: R41.82 
ICD-9-CM: 780.97  4/24/2019 - 4/25/2019 RESOLVED: SANDR[facility] (acute kidney [facility]) [facility] ICD-10-CM: N17.9 ICD-9-CM: 584.9  4/24/2019 - 4/25/2019 Greater than 30  minutes were spent with the patient on counseling and coordination of care Signed:  
Clarisse Au MD 
4/25/2019 
8:22 AM

## 2019-04-25 NOTE — PROGRESS NOTES
Patient listed as not having a primary care physician. Hospital follow-up PCP transitional care appointment has been scheduled with Dr. Tahira Tom for Wednesday, 5/1/19 at 10:30 a.m. Pending patient discharge.   Luci Mary, Care Management Specialist.

## 2020-01-01 ENCOUNTER — HOME CARE VISIT (OUTPATIENT)
Dept: SCHEDULING | Facility: HOME HEALTH | Age: 85
End: 2020-01-01
Payer: MEDICARE

## 2020-01-01 ENCOUNTER — APPOINTMENT (OUTPATIENT)
Dept: CT IMAGING | Age: 85
DRG: 963 | End: 2020-01-01
Attending: EMERGENCY MEDICINE
Payer: MEDICARE

## 2020-01-01 ENCOUNTER — APPOINTMENT (OUTPATIENT)
Dept: GENERAL RADIOLOGY | Age: 85
DRG: 963 | End: 2020-01-01
Attending: INTERNAL MEDICINE
Payer: MEDICARE

## 2020-01-01 ENCOUNTER — APPOINTMENT (OUTPATIENT)
Dept: GENERAL RADIOLOGY | Age: 85
DRG: 963 | End: 2020-01-01
Attending: FAMILY MEDICINE
Payer: MEDICARE

## 2020-01-01 ENCOUNTER — HOME CARE VISIT (OUTPATIENT)
Dept: HOSPICE | Facility: HOSPICE | Age: 85
End: 2020-01-01
Payer: MEDICARE

## 2020-01-01 ENCOUNTER — APPOINTMENT (OUTPATIENT)
Dept: CT IMAGING | Age: 85
DRG: 963 | End: 2020-01-01
Attending: NURSE PRACTITIONER
Payer: MEDICARE

## 2020-01-01 ENCOUNTER — HOSPITAL ENCOUNTER (INPATIENT)
Age: 85
LOS: 5 days | Discharge: HOSPICE | DRG: 963 | End: 2020-06-27
Attending: EMERGENCY MEDICINE | Admitting: FAMILY MEDICINE
Payer: MEDICARE

## 2020-01-01 ENCOUNTER — APPOINTMENT (OUTPATIENT)
Dept: ULTRASOUND IMAGING | Age: 85
DRG: 963 | End: 2020-01-01
Attending: FAMILY MEDICINE
Payer: MEDICARE

## 2020-01-01 ENCOUNTER — HOSPICE ADMISSION (OUTPATIENT)
Dept: HOSPICE | Facility: HOSPICE | Age: 85
End: 2020-01-01
Payer: MEDICARE

## 2020-01-01 ENCOUNTER — APPOINTMENT (OUTPATIENT)
Dept: CT IMAGING | Age: 85
DRG: 963 | End: 2020-01-01
Attending: FAMILY MEDICINE
Payer: MEDICARE

## 2020-01-01 VITALS
TEMPERATURE: 97.1 F | HEART RATE: 90 BPM | RESPIRATION RATE: 20 BRPM | OXYGEN SATURATION: 92 % | SYSTOLIC BLOOD PRESSURE: 110 MMHG | DIASTOLIC BLOOD PRESSURE: 70 MMHG

## 2020-01-01 VITALS
OXYGEN SATURATION: 96 % | HEART RATE: 63 BPM | HEIGHT: 71 IN | DIASTOLIC BLOOD PRESSURE: 65 MMHG | BODY MASS INDEX: 17.64 KG/M2 | WEIGHT: 126 LBS | TEMPERATURE: 98 F | SYSTOLIC BLOOD PRESSURE: 125 MMHG | RESPIRATION RATE: 18 BRPM

## 2020-01-01 VITALS — RESPIRATION RATE: 20 BRPM | HEART RATE: 71 BPM | SYSTOLIC BLOOD PRESSURE: 100 MMHG | DIASTOLIC BLOOD PRESSURE: 52 MMHG

## 2020-01-01 VITALS
HEART RATE: 70 BPM | SYSTOLIC BLOOD PRESSURE: 86 MMHG | DIASTOLIC BLOOD PRESSURE: 58 MMHG | RESPIRATION RATE: 10 BRPM | OXYGEN SATURATION: 92 %

## 2020-01-01 DIAGNOSIS — R29.6 FALLS FREQUENTLY: ICD-10-CM

## 2020-01-01 DIAGNOSIS — S06.5XAA SDH (SUBDURAL HEMATOMA): ICD-10-CM

## 2020-01-01 DIAGNOSIS — Z71.89 GOALS OF CARE, COUNSELING/DISCUSSION: ICD-10-CM

## 2020-01-01 DIAGNOSIS — G30.1 LATE ONSET ALZHEIMER'S DISEASE WITHOUT BEHAVIORAL DISTURBANCE (HCC): ICD-10-CM

## 2020-01-01 DIAGNOSIS — F02.80 LATE ONSET ALZHEIMER'S DISEASE WITHOUT BEHAVIORAL DISTURBANCE (HCC): ICD-10-CM

## 2020-01-01 DIAGNOSIS — S06.5XAA SUBDURAL HEMATOMA: Primary | ICD-10-CM

## 2020-01-01 LAB
ALBUMIN SERPL-MCNC: 2.9 G/DL (ref 3.5–5)
ALBUMIN SERPL-MCNC: 3.2 G/DL (ref 3.5–5)
ALBUMIN SERPL-MCNC: 3.6 G/DL (ref 3.5–5)
ALBUMIN/GLOB SERPL: 0.7 {RATIO} (ref 1.1–2.2)
ALBUMIN/GLOB SERPL: 0.9 {RATIO} (ref 1.1–2.2)
ALBUMIN/GLOB SERPL: 0.9 {RATIO} (ref 1.1–2.2)
ALP SERPL-CCNC: 104 U/L (ref 45–117)
ALP SERPL-CCNC: 119 U/L (ref 45–117)
ALP SERPL-CCNC: 127 U/L (ref 45–117)
ALT SERPL-CCNC: 49 U/L (ref 12–78)
ALT SERPL-CCNC: 51 U/L (ref 12–78)
ALT SERPL-CCNC: 51 U/L (ref 12–78)
ANION GAP SERPL CALC-SCNC: 10 MMOL/L (ref 5–15)
ANION GAP SERPL CALC-SCNC: 8 MMOL/L (ref 5–15)
ANION GAP SERPL CALC-SCNC: 9 MMOL/L (ref 5–15)
APPEARANCE UR: ABNORMAL
APTT PPP: 28.2 SEC (ref 22.1–32)
APTT PPP: 28.6 SEC (ref 22.1–32)
AST SERPL-CCNC: 131 U/L (ref 15–37)
AST SERPL-CCNC: 207 U/L (ref 15–37)
AST SERPL-CCNC: 217 U/L (ref 15–37)
B PERT DNA SPEC QL NAA+PROBE: NOT DETECTED
BACTERIA SPEC CULT: NORMAL
BACTERIA SPEC CULT: NORMAL
BACTERIA URNS QL MICRO: ABNORMAL /HPF
BASOPHILS # BLD: 0 K/UL (ref 0–0.1)
BASOPHILS # BLD: 0 K/UL (ref 0–0.1)
BASOPHILS NFR BLD: 0 % (ref 0–1)
BASOPHILS NFR BLD: 0 % (ref 0–1)
BILIRUB DIRECT SERPL-MCNC: 0.2 MG/DL (ref 0–0.2)
BILIRUB SERPL-MCNC: 0.6 MG/DL (ref 0.2–1)
BILIRUB SERPL-MCNC: 0.6 MG/DL (ref 0.2–1)
BILIRUB SERPL-MCNC: 0.8 MG/DL (ref 0.2–1)
BILIRUB UR QL: NEGATIVE
BORDETELLA PARAPERTUSSIS PCR, BORPAR: NOT DETECTED
BUN SERPL-MCNC: 52 MG/DL (ref 6–20)
BUN SERPL-MCNC: 61 MG/DL (ref 6–20)
BUN SERPL-MCNC: 64 MG/DL (ref 6–20)
BUN/CREAT SERPL: 38 (ref 12–20)
BUN/CREAT SERPL: 42 (ref 12–20)
BUN/CREAT SERPL: 44 (ref 12–20)
C PNEUM DNA SPEC QL NAA+PROBE: NOT DETECTED
CALCIUM SERPL-MCNC: 10.2 MG/DL (ref 8.5–10.1)
CALCIUM SERPL-MCNC: 8.8 MG/DL (ref 8.5–10.1)
CALCIUM SERPL-MCNC: 9.4 MG/DL (ref 8.5–10.1)
CHLORIDE SERPL-SCNC: 110 MMOL/L (ref 97–108)
CHLORIDE SERPL-SCNC: 112 MMOL/L (ref 97–108)
CHLORIDE SERPL-SCNC: 116 MMOL/L (ref 97–108)
CHOLEST SERPL-MCNC: 150 MG/DL
CK SERPL-CCNC: 2057 U/L (ref 39–308)
CK SERPL-CCNC: 6021 U/L (ref 39–308)
CK SERPL-CCNC: 6935 U/L (ref 39–308)
CO2 SERPL-SCNC: 19 MMOL/L (ref 21–32)
CO2 SERPL-SCNC: 20 MMOL/L (ref 21–32)
CO2 SERPL-SCNC: 21 MMOL/L (ref 21–32)
COLOR UR: ABNORMAL
CREAT SERPL-MCNC: 1.25 MG/DL (ref 0.7–1.3)
CREAT SERPL-MCNC: 1.47 MG/DL (ref 0.7–1.3)
CREAT SERPL-MCNC: 1.62 MG/DL (ref 0.7–1.3)
D DIMER PPP FEU-MCNC: 3.46 MG/L FEU (ref 0–0.65)
D DIMER PPP FEU-MCNC: 3.83 MG/L FEU (ref 0–0.65)
DIFFERENTIAL METHOD BLD: ABNORMAL
DIFFERENTIAL METHOD BLD: ABNORMAL
EOSINOPHIL # BLD: 0 K/UL (ref 0–0.4)
EOSINOPHIL # BLD: 0 K/UL (ref 0–0.4)
EOSINOPHIL NFR BLD: 0 % (ref 0–7)
EOSINOPHIL NFR BLD: 0 % (ref 0–7)
EPITH CASTS URNS QL MICRO: ABNORMAL /LPF
ERYTHROCYTE [DISTWIDTH] IN BLOOD BY AUTOMATED COUNT: 14.5 % (ref 11.5–14.5)
ERYTHROCYTE [DISTWIDTH] IN BLOOD BY AUTOMATED COUNT: 14.6 % (ref 11.5–14.5)
ERYTHROCYTE [DISTWIDTH] IN BLOOD BY AUTOMATED COUNT: 15 % (ref 11.5–14.5)
FERRITIN SERPL-MCNC: 559 NG/ML (ref 26–388)
FIBRINOGEN PPP-MCNC: 658 MG/DL (ref 200–475)
FIBRINOGEN PPP-MCNC: 673 MG/DL (ref 200–475)
FLUAV H1 2009 PAND RNA SPEC QL NAA+PROBE: NOT DETECTED
FLUAV H1 RNA SPEC QL NAA+PROBE: NOT DETECTED
FLUAV H3 RNA SPEC QL NAA+PROBE: NOT DETECTED
FLUAV SUBTYP SPEC NAA+PROBE: NOT DETECTED
FLUBV RNA SPEC QL NAA+PROBE: NOT DETECTED
GLOBULIN SER CALC-MCNC: 3.4 G/DL (ref 2–4)
GLOBULIN SER CALC-MCNC: 4 G/DL (ref 2–4)
GLOBULIN SER CALC-MCNC: 4.2 G/DL (ref 2–4)
GLUCOSE SERPL-MCNC: 126 MG/DL (ref 65–100)
GLUCOSE SERPL-MCNC: 76 MG/DL (ref 65–100)
GLUCOSE SERPL-MCNC: 88 MG/DL (ref 65–100)
GLUCOSE UR STRIP.AUTO-MCNC: NEGATIVE MG/DL
HADV DNA SPEC QL NAA+PROBE: NOT DETECTED
HAV IGM SER QL: NONREACTIVE
HBV CORE IGM SER QL: NONREACTIVE
HBV SURFACE AG SER QL: <0.1 INDEX
HBV SURFACE AG SER QL: NEGATIVE
HCOV 229E RNA SPEC QL NAA+PROBE: NOT DETECTED
HCOV HKU1 RNA SPEC QL NAA+PROBE: NOT DETECTED
HCOV NL63 RNA SPEC QL NAA+PROBE: NOT DETECTED
HCOV OC43 RNA SPEC QL NAA+PROBE: NOT DETECTED
HCT VFR BLD AUTO: 39.1 % (ref 36.6–50.3)
HCT VFR BLD AUTO: 40.2 % (ref 36.6–50.3)
HCT VFR BLD AUTO: 41.4 % (ref 36.6–50.3)
HCV AB SERPL QL IA: NONREACTIVE
HCV COMMENT,HCGAC: NORMAL
HDLC SERPL-MCNC: 67 MG/DL
HDLC SERPL: 2.2 {RATIO} (ref 0–5)
HGB BLD-MCNC: 12.8 G/DL (ref 12.1–17)
HGB BLD-MCNC: 12.8 G/DL (ref 12.1–17)
HGB BLD-MCNC: 13.6 G/DL (ref 12.1–17)
HGB UR QL STRIP: ABNORMAL
HMPV RNA SPEC QL NAA+PROBE: NOT DETECTED
HPIV1 RNA SPEC QL NAA+PROBE: NOT DETECTED
HPIV2 RNA SPEC QL NAA+PROBE: NOT DETECTED
HPIV3 RNA SPEC QL NAA+PROBE: NOT DETECTED
HPIV4 RNA SPEC QL NAA+PROBE: NOT DETECTED
IMM GRANULOCYTES # BLD AUTO: 0 K/UL (ref 0–0.04)
IMM GRANULOCYTES # BLD AUTO: 0.1 K/UL (ref 0–0.04)
IMM GRANULOCYTES NFR BLD AUTO: 0 % (ref 0–0.5)
IMM GRANULOCYTES NFR BLD AUTO: 1 % (ref 0–0.5)
INR PPP: 1 (ref 0.9–1.1)
INR PPP: 1 (ref 0.9–1.1)
KETONES UR QL STRIP.AUTO: NEGATIVE MG/DL
LDH SERPL L TO P-CCNC: 275 U/L (ref 85–241)
LDLC SERPL CALC-MCNC: 67 MG/DL (ref 0–100)
LEUKOCYTE ESTERASE UR QL STRIP.AUTO: NEGATIVE
LIPID PROFILE,FLP: NORMAL
LYMPHOCYTES # BLD: 0.6 K/UL (ref 0.8–3.5)
LYMPHOCYTES # BLD: 1 K/UL (ref 0.8–3.5)
LYMPHOCYTES NFR BLD: 4 % (ref 12–49)
LYMPHOCYTES NFR BLD: 8 % (ref 12–49)
M PNEUMO DNA SPEC QL NAA+PROBE: NOT DETECTED
MCH RBC QN AUTO: 31.3 PG (ref 26–34)
MCH RBC QN AUTO: 31.5 PG (ref 26–34)
MCH RBC QN AUTO: 31.6 PG (ref 26–34)
MCHC RBC AUTO-ENTMCNC: 31.8 G/DL (ref 30–36.5)
MCHC RBC AUTO-ENTMCNC: 32.7 G/DL (ref 30–36.5)
MCHC RBC AUTO-ENTMCNC: 32.9 G/DL (ref 30–36.5)
MCV RBC AUTO: 95.2 FL (ref 80–99)
MCV RBC AUTO: 96.3 FL (ref 80–99)
MCV RBC AUTO: 99.3 FL (ref 80–99)
MONOCYTES # BLD: 1.4 K/UL (ref 0–1)
MONOCYTES # BLD: 1.9 K/UL (ref 0–1)
MONOCYTES NFR BLD: 11 % (ref 5–13)
MONOCYTES NFR BLD: 12 % (ref 5–13)
NEUTS SEG # BLD: 10 K/UL (ref 1.8–8)
NEUTS SEG # BLD: 13.7 K/UL (ref 1.8–8)
NEUTS SEG NFR BLD: 80 % (ref 32–75)
NEUTS SEG NFR BLD: 84 % (ref 32–75)
NITRITE UR QL STRIP.AUTO: NEGATIVE
NRBC # BLD: 0 K/UL (ref 0–0.01)
NRBC BLD-RTO: 0 PER 100 WBC
PH UR STRIP: 5 [PH] (ref 5–8)
PLATELET # BLD AUTO: 225 K/UL (ref 150–400)
PLATELET # BLD AUTO: 234 K/UL (ref 150–400)
PLATELET # BLD AUTO: 268 K/UL (ref 150–400)
PMV BLD AUTO: 10.3 FL (ref 8.9–12.9)
PMV BLD AUTO: 10.4 FL (ref 8.9–12.9)
PMV BLD AUTO: 10.8 FL (ref 8.9–12.9)
POTASSIUM SERPL-SCNC: 3.8 MMOL/L (ref 3.5–5.1)
POTASSIUM SERPL-SCNC: 3.9 MMOL/L (ref 3.5–5.1)
POTASSIUM SERPL-SCNC: 4.2 MMOL/L (ref 3.5–5.1)
PROT SERPL-MCNC: 6.6 G/DL (ref 6.4–8.2)
PROT SERPL-MCNC: 6.9 G/DL (ref 6.4–8.2)
PROT SERPL-MCNC: 7.8 G/DL (ref 6.4–8.2)
PROT UR STRIP-MCNC: 30 MG/DL
PROTHROMBIN TIME: 10.4 SEC (ref 9–11.1)
PROTHROMBIN TIME: 10.5 SEC (ref 9–11.1)
RBC # BLD AUTO: 4.05 M/UL (ref 4.1–5.7)
RBC # BLD AUTO: 4.06 M/UL (ref 4.1–5.7)
RBC # BLD AUTO: 4.35 M/UL (ref 4.1–5.7)
RBC #/AREA URNS HPF: ABNORMAL /HPF (ref 0–5)
RBC MORPH BLD: ABNORMAL
RSV RNA SPEC QL NAA+PROBE: NOT DETECTED
RV+EV RNA SPEC QL NAA+PROBE: NOT DETECTED
SARS-COV-2, COV2: NOT DETECTED
SERVICE CMNT-IMP: NORMAL
SERVICE CMNT-IMP: NORMAL
SODIUM SERPL-SCNC: 140 MMOL/L (ref 136–145)
SODIUM SERPL-SCNC: 142 MMOL/L (ref 136–145)
SODIUM SERPL-SCNC: 143 MMOL/L (ref 136–145)
SOURCE, COVRS: NORMAL
SP GR UR REFRACTOMETRY: 1.02 (ref 1–1.03)
SP1: NORMAL
SP2: NORMAL
SP3: NORMAL
SPECIMEN SOURCE, FCOV2M: NORMAL
THERAPEUTIC RANGE,PTTT: NORMAL SECS (ref 58–77)
THERAPEUTIC RANGE,PTTT: NORMAL SECS (ref 58–77)
TRIGL SERPL-MCNC: 80 MG/DL (ref ?–150)
TROPONIN I SERPL-MCNC: 0.06 NG/ML
UR CULT HOLD, URHOLD: NORMAL
UROBILINOGEN UR QL STRIP.AUTO: 0.2 EU/DL (ref 0.2–1)
VLDLC SERPL CALC-MCNC: 16 MG/DL
WBC # BLD AUTO: 12.5 K/UL (ref 4.1–11.1)
WBC # BLD AUTO: 14.4 K/UL (ref 4.1–11.1)
WBC # BLD AUTO: 16.2 K/UL (ref 4.1–11.1)
WBC URNS QL MICRO: ABNORMAL /HPF (ref 0–4)

## 2020-01-01 PROCEDURE — 74011250636 HC RX REV CODE- 250/636: Performed by: FAMILY MEDICINE

## 2020-01-01 PROCEDURE — 51702 INSERT TEMP BLADDER CATH: CPT

## 2020-01-01 PROCEDURE — 36415 COLL VENOUS BLD VENIPUNCTURE: CPT

## 2020-01-01 PROCEDURE — 0651 HSPC ROUTINE HOME CARE

## 2020-01-01 PROCEDURE — 3336500001 HSPC ELECTION

## 2020-01-01 PROCEDURE — 94640 AIRWAY INHALATION TREATMENT: CPT

## 2020-01-01 PROCEDURE — 77030029684 HC NEB SM VOL KT MONA -A

## 2020-01-01 PROCEDURE — T4523 ADULT SIZE BRIEF/DIAPER LG: HCPCS

## 2020-01-01 PROCEDURE — 65660000000 HC RM CCU STEPDOWN

## 2020-01-01 PROCEDURE — 85610 PROTHROMBIN TIME: CPT

## 2020-01-01 PROCEDURE — G0299 HHS/HOSPICE OF RN EA 15 MIN: HCPCS

## 2020-01-01 PROCEDURE — 72072 X-RAY EXAM THORAC SPINE 3VWS: CPT

## 2020-01-01 PROCEDURE — 94760 N-INVAS EAR/PLS OXIMETRY 1: CPT

## 2020-01-01 PROCEDURE — 74011000250 HC RX REV CODE- 250: Performed by: INTERNAL MEDICINE

## 2020-01-01 PROCEDURE — 74011250637 HC RX REV CODE- 250/637: Performed by: FAMILY MEDICINE

## 2020-01-01 PROCEDURE — 72170 X-RAY EXAM OF PELVIS: CPT

## 2020-01-01 PROCEDURE — 85379 FIBRIN DEGRADATION QUANT: CPT

## 2020-01-01 PROCEDURE — 71045 X-RAY EXAM CHEST 1 VIEW: CPT

## 2020-01-01 PROCEDURE — 97116 GAIT TRAINING THERAPY: CPT

## 2020-01-01 PROCEDURE — T4541 LARGE DISPOSABLE UNDERPAD: HCPCS

## 2020-01-01 PROCEDURE — 85025 COMPLETE CBC W/AUTO DIFF WBC: CPT

## 2020-01-01 PROCEDURE — 85384 FIBRINOGEN ACTIVITY: CPT

## 2020-01-01 PROCEDURE — 3331090004 HSPC SERVICE INTENSITY ADD-ON

## 2020-01-01 PROCEDURE — 97535 SELF CARE MNGMENT TRAINING: CPT

## 2020-01-01 PROCEDURE — A9270 NON-COVERED ITEM OR SERVICE: HCPCS

## 2020-01-01 PROCEDURE — 80061 LIPID PANEL: CPT

## 2020-01-01 PROCEDURE — 94664 DEMO&/EVAL PT USE INHALER: CPT

## 2020-01-01 PROCEDURE — 80074 ACUTE HEPATITIS PANEL: CPT

## 2020-01-01 PROCEDURE — 82550 ASSAY OF CK (CPK): CPT

## 2020-01-01 PROCEDURE — 74011000258 HC RX REV CODE- 258: Performed by: FAMILY MEDICINE

## 2020-01-01 PROCEDURE — 85730 THROMBOPLASTIN TIME PARTIAL: CPT

## 2020-01-01 PROCEDURE — 77010033678 HC OXYGEN DAILY

## 2020-01-01 PROCEDURE — 72131 CT LUMBAR SPINE W/O DYE: CPT

## 2020-01-01 PROCEDURE — 73552 X-RAY EXAM OF FEMUR 2/>: CPT

## 2020-01-01 PROCEDURE — 81001 URINALYSIS AUTO W/SCOPE: CPT

## 2020-01-01 PROCEDURE — 73590 X-RAY EXAM OF LOWER LEG: CPT

## 2020-01-01 PROCEDURE — 80076 HEPATIC FUNCTION PANEL: CPT

## 2020-01-01 PROCEDURE — 87086 URINE CULTURE/COLONY COUNT: CPT

## 2020-01-01 PROCEDURE — 84484 ASSAY OF TROPONIN QUANT: CPT

## 2020-01-01 PROCEDURE — 87635 SARS-COV-2 COVID-19 AMP PRB: CPT

## 2020-01-01 PROCEDURE — 83615 LACTATE (LD) (LDH) ENZYME: CPT

## 2020-01-01 PROCEDURE — A4927 NON-STERILE GLOVES: HCPCS

## 2020-01-01 PROCEDURE — 0100U RESPIRATORY PANEL,PCR,NASOPHARYNGEAL: CPT

## 2020-01-01 PROCEDURE — G0300 HHS/HOSPICE OF LPN EA 15 MIN: HCPCS

## 2020-01-01 PROCEDURE — 99285 EMERGENCY DEPT VISIT HI MDM: CPT

## 2020-01-01 PROCEDURE — 97165 OT EVAL LOW COMPLEX 30 MIN: CPT

## 2020-01-01 PROCEDURE — 65270000029 HC RM PRIVATE

## 2020-01-01 PROCEDURE — 80053 COMPREHEN METABOLIC PANEL: CPT

## 2020-01-01 PROCEDURE — 76705 ECHO EXAM OF ABDOMEN: CPT

## 2020-01-01 PROCEDURE — 72125 CT NECK SPINE W/O DYE: CPT

## 2020-01-01 PROCEDURE — 87040 BLOOD CULTURE FOR BACTERIA: CPT

## 2020-01-01 PROCEDURE — 80048 BASIC METABOLIC PNL TOTAL CA: CPT

## 2020-01-01 PROCEDURE — 97162 PT EVAL MOD COMPLEX 30 MIN: CPT

## 2020-01-01 PROCEDURE — 82728 ASSAY OF FERRITIN: CPT

## 2020-01-01 PROCEDURE — 70450 CT HEAD/BRAIN W/O DYE: CPT

## 2020-01-01 PROCEDURE — 85027 COMPLETE CBC AUTOMATED: CPT

## 2020-01-01 RX ORDER — ACETAMINOPHEN 325 MG/1
650 TABLET ORAL
Status: DISCONTINUED | OUTPATIENT
Start: 2020-01-01 | End: 2020-01-01 | Stop reason: HOSPADM

## 2020-01-01 RX ORDER — SODIUM CHLORIDE 9 MG/ML
75 INJECTION, SOLUTION INTRAVENOUS CONTINUOUS
Status: DISCONTINUED | OUTPATIENT
Start: 2020-01-01 | End: 2020-01-01

## 2020-01-01 RX ORDER — ACETAMINOPHEN 325 MG/1
650 TABLET ORAL
Status: DISCONTINUED | OUTPATIENT
Start: 2020-01-01 | End: 2020-01-01

## 2020-01-01 RX ORDER — LORAZEPAM 2 MG/ML
1 CONCENTRATE ORAL
Status: DISCONTINUED | OUTPATIENT
Start: 2020-01-01 | End: 2020-01-01 | Stop reason: HOSPADM

## 2020-01-01 RX ORDER — HYDRALAZINE HYDROCHLORIDE 20 MG/ML
10 INJECTION INTRAMUSCULAR; INTRAVENOUS
Status: DISCONTINUED | OUTPATIENT
Start: 2020-01-01 | End: 2020-01-01 | Stop reason: HOSPADM

## 2020-01-01 RX ORDER — ACETAMINOPHEN 650 MG/1
650 SUPPOSITORY RECTAL
Status: CANCELLED | OUTPATIENT
Start: 2020-01-01

## 2020-01-01 RX ORDER — NALOXONE HYDROCHLORIDE 0.4 MG/ML
0.4 INJECTION, SOLUTION INTRAMUSCULAR; INTRAVENOUS; SUBCUTANEOUS AS NEEDED
Status: DISCONTINUED | OUTPATIENT
Start: 2020-01-01 | End: 2020-01-01 | Stop reason: HOSPADM

## 2020-01-01 RX ORDER — ONDANSETRON 2 MG/ML
4 INJECTION INTRAMUSCULAR; INTRAVENOUS
Status: DISCONTINUED | OUTPATIENT
Start: 2020-01-01 | End: 2020-01-01 | Stop reason: HOSPADM

## 2020-01-01 RX ORDER — ACETAMINOPHEN 650 MG/1
650 SUPPOSITORY RECTAL
Status: DISCONTINUED | OUTPATIENT
Start: 2020-01-01 | End: 2020-01-01

## 2020-01-01 RX ORDER — IPRATROPIUM BROMIDE AND ALBUTEROL SULFATE 2.5; .5 MG/3ML; MG/3ML
3 SOLUTION RESPIRATORY (INHALATION)
Status: DISCONTINUED | OUTPATIENT
Start: 2020-01-01 | End: 2020-01-01 | Stop reason: HOSPADM

## 2020-01-01 RX ORDER — HYDROMORPHONE HYDROCHLORIDE 5 MG/5ML
2 SOLUTION ORAL
Status: DISCONTINUED | OUTPATIENT
Start: 2020-01-01 | End: 2020-01-01 | Stop reason: HOSPADM

## 2020-01-01 RX ORDER — ACETAMINOPHEN 325 MG/1
650 TABLET ORAL
Status: CANCELLED | OUTPATIENT
Start: 2020-01-01

## 2020-01-01 RX ADMIN — IPRATROPIUM BROMIDE AND ALBUTEROL SULFATE 3 ML: .5; 3 SOLUTION RESPIRATORY (INHALATION) at 19:36

## 2020-01-01 RX ADMIN — SODIUM CHLORIDE 500 MG: 900 INJECTION, SOLUTION INTRAVENOUS at 21:58

## 2020-01-01 RX ADMIN — IPRATROPIUM BROMIDE AND ALBUTEROL SULFATE 3 ML: .5; 3 SOLUTION RESPIRATORY (INHALATION) at 14:00

## 2020-01-01 RX ADMIN — IPRATROPIUM BROMIDE AND ALBUTEROL SULFATE 3 ML: .5; 3 SOLUTION RESPIRATORY (INHALATION) at 01:01

## 2020-01-01 RX ADMIN — ACETAMINOPHEN 650 MG: 325 TABLET ORAL at 02:43

## 2020-01-01 RX ADMIN — IPRATROPIUM BROMIDE AND ALBUTEROL SULFATE 3 ML: .5; 3 SOLUTION RESPIRATORY (INHALATION) at 20:58

## 2020-01-01 RX ADMIN — FAMOTIDINE 20 MG: 10 INJECTION, SOLUTION INTRAVENOUS at 21:58

## 2020-01-01 RX ADMIN — HYDRALAZINE HYDROCHLORIDE 10 MG: 20 INJECTION INTRAMUSCULAR; INTRAVENOUS at 00:21

## 2020-01-01 RX ADMIN — FAMOTIDINE 20 MG: 10 INJECTION, SOLUTION INTRAVENOUS at 21:01

## 2020-01-01 RX ADMIN — SODIUM CHLORIDE 500 MG: 900 INJECTION, SOLUTION INTRAVENOUS at 11:52

## 2020-01-01 RX ADMIN — IPRATROPIUM BROMIDE AND ALBUTEROL SULFATE 3 ML: .5; 3 SOLUTION RESPIRATORY (INHALATION) at 08:29

## 2020-01-01 RX ADMIN — SODIUM CHLORIDE 75 ML/HR: 900 INJECTION, SOLUTION INTRAVENOUS at 08:56

## 2020-01-01 RX ADMIN — CEFTRIAXONE 1 G: 1 INJECTION, POWDER, FOR SOLUTION INTRAMUSCULAR; INTRAVENOUS at 18:24

## 2020-01-01 RX ADMIN — IPRATROPIUM BROMIDE AND ALBUTEROL SULFATE 3 ML: .5; 3 SOLUTION RESPIRATORY (INHALATION) at 01:54

## 2020-01-01 RX ADMIN — IPRATROPIUM BROMIDE AND ALBUTEROL SULFATE 3 ML: .5; 3 SOLUTION RESPIRATORY (INHALATION) at 20:43

## 2020-01-01 RX ADMIN — SODIUM CHLORIDE 500 MG: 900 INJECTION, SOLUTION INTRAVENOUS at 21:08

## 2020-01-01 RX ADMIN — IPRATROPIUM BROMIDE AND ALBUTEROL SULFATE 3 ML: .5; 3 SOLUTION RESPIRATORY (INHALATION) at 08:26

## 2020-01-01 RX ADMIN — IPRATROPIUM BROMIDE AND ALBUTEROL SULFATE 3 ML: .5; 3 SOLUTION RESPIRATORY (INHALATION) at 13:21

## 2020-01-01 RX ADMIN — IPRATROPIUM BROMIDE AND ALBUTEROL SULFATE 3 ML: .5; 3 SOLUTION RESPIRATORY (INHALATION) at 02:00

## 2020-01-01 RX ADMIN — SODIUM CHLORIDE 500 MG: 900 INJECTION, SOLUTION INTRAVENOUS at 08:56

## 2020-01-01 RX ADMIN — SODIUM CHLORIDE 75 ML/HR: 900 INJECTION, SOLUTION INTRAVENOUS at 20:52

## 2020-01-01 RX ADMIN — SODIUM CHLORIDE 75 ML/HR: 900 INJECTION, SOLUTION INTRAVENOUS at 21:07

## 2020-01-01 RX ADMIN — IPRATROPIUM BROMIDE AND ALBUTEROL SULFATE 3 ML: .5; 3 SOLUTION RESPIRATORY (INHALATION) at 16:08

## 2020-06-22 PROBLEM — S06.5XAA SDH (SUBDURAL HEMATOMA): Status: ACTIVE | Noted: 2020-01-01

## 2020-06-22 NOTE — ED PROVIDER NOTES
80-year-old male with dementia presents after a ground-level fall. Reportedly he was more weak than normal per EMS  
 
spoke to son Noemi Posada on telephone:  Fall yesterday and today. Patient lives alone but son lives very close. Was found on the floor today at approximately 8:30 AM.  Unsure of what time he fell. Patient was unable to bear weight or walk. He normally walks without assistance. Mental status is at baseline per his son and EMS. He is not on any blood thinners. He takes no medications. His son states that his legs are now weak and he is unable to walk. Fall Past Medical History:  
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  
Procedure Laterality Date  HX OTHER SURGICAL Tumor resection from left breast and bladder History reviewed. No pertinent family history. Social History Socioeconomic History  Marital status: SINGLE Spouse name: Not on file  Number of children: Not on file  Years of education: Not on file  Highest education level: Not on file Occupational History  Not on file Social Needs  Financial resource strain: Not on file  Food insecurity Worry: Not on file Inability: Not on file  Transportation needs Medical: Not on file Non-medical: Not on file Tobacco Use  Smoking status: Current Every Day Smoker  Smokeless tobacco: Never Used  Tobacco comment: Smokes a pipe Substance and Sexual Activity  Alcohol use: Yes Alcohol/week: 14.0 standard drinks Types: 14 Shots of liquor per week  Drug use: Never  Sexual activity: Not on file Lifestyle  Physical activity Days per week: Not on file Minutes per session: Not on file  Stress: Not on file Relationships  Social connections Talks on phone: Not on file Gets together: Not on file Attends [employer] service: Not on file Active member of club or organization: Not on file Attends meetings of clubs or organizations: Not on file Relationship status: Not on file  Intimate partner violence Fear of current or ex partner: Not on file Emotionally abused: Not on file Physically abused: Not on file Forced sexual activity: Not on file Other Topics Concern  Not on file Social History Narrative  Not on file ALLERGIES: Patient has no known allergies. Review of Systems Unable to perform ROS: Dementia Vitals:  
 06/22/20 1208 06/22/20 1213 06/22/20 1214 06/22/20 1300 BP: 132/68   129/80 Pulse: 81 Resp: 18 Temp: 98.8 °F (37.1 °C) SpO2: 95% 95%  95% Weight:   56.8 kg (125 lb 3.5 oz) Height:   5' 11\" (1.803 m) Physical Exam 
Vitals signs and nursing note reviewed. Constitutional:   
   General: He is not in acute distress. Comments: Elderly, frail HENT:  
   Head: Normocephalic and atraumatic. Eyes:  
   General: No scleral icterus. Conjunctiva/sclera: Conjunctivae normal.  
   Pupils: Pupils are equal, round, and reactive to light. Neck: Musculoskeletal: No neck rigidity. Trachea: No tracheal deviation. Cardiovascular:  
   Rate and Rhythm: Normal rate and regular rhythm. Pulmonary:  
   Effort: Pulmonary effort is normal. No respiratory distress. Breath sounds: Normal breath sounds. No stridor. Abdominal:  
   General: There is no distension. Palpations: Abdomen is soft. Tenderness: There is no abdominal tenderness. Genitourinary: 
   Comments: deferred Musculoskeletal:     
   General: No deformity. Skin: 
   General: Skin is warm and dry. Neurological:  
   Mental Status: He is alert. Mental status is at baseline. Comments: Alert but confused Psychiatric:     
   Mood and Affect: Mood normal.     
   Behavior: Behavior normal.  
 
  
 
MDM Number of Diagnoses or Management Options Diagnosis management comments: 43-year-old male with weakness after fall. Will obtain basic labs, CT of the head and lower back. If unremarkable will attempt to ambulate patient. ED Course as of Jun 22 1513 [address] Herbertmaximiliano Jun 22, 2020  
1412 Spoke to son Luz Gutierrez [phone] [TT] [phone] Neurosurgery consulted [TT] ED Course User Index [TT] Komal Gutierrez MD  
 
 
Procedures Hospitalist Perfect Serve for Admission 3:13 PM 
 
ED Room Number: ER07/07 Patient Name and age:  Michael Webber 80 y.o.  male Working Diagnosis: 1. Subdural hematoma (HCC) COVID-19 Suspicion:  no 
 
Code Status:  Do Not Resuscitate Readmission: no 
Isolation Requirements:  no 
Recommended Level of Care:  med/surg Department:[facility] Adult ED - [phone] Other:  Neurosurgery consult pending LABORATORY TESTS: 
Labs Reviewed CBC WITH AUTOMATED DIFF - Abnormal; Notable for the following components:  
    Result Value WBC 16.2 (*) NEUTROPHILS 84 (*) LYMPHOCYTES 4 (*)   
 ABS. NEUTROPHILS 13.7 (*)   
 ABS. LYMPHOCYTES 0.6 (*)   
 ABS. MONOCYTES 1.9 (*) All other components within normal limits METABOLIC PANEL, COMPREHENSIVE - Abnormal; Notable for the following components:  
 Chloride 110 (*) Glucose 126 (*) BUN 61 (*) Creatinine 1.62 (*)   
 BUN/Creatinine ratio 38 (*)   
 GFR est AA 48 (*)   
 GFR est non-AA 40 (*) Calcium 10.2 (*) AST (SGOT) 207 (*) Alk. phosphatase 127 (*) Globulin 4.2 (*) A-G Ratio 0.9 (*) All other components within normal limits URINE CULTURE HOLD SAMPLE  
URINALYSIS W/MICROSCOPIC  
CK IMAGING RESULTS: 
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 No herniation. Findings were communicated to the ED at the time of interpretation. CT SPINE LUMB WO CONT    (Results Pending) MEDICATIONS GIVEN: 
Medications - No data to display IMPRESSION/ PLAN: 
 1. Subdural hematoma ([facility]   
 
- admit 
- neurosurgery consult Total critical care time spent exclusive of procedures:  34 minutes Emily Joy.  Reba Lin MD

## 2020-06-22 NOTE — PROGRESS NOTES
Transition of Care Plan: · RUR:  7 %  GLOS:  Not Assigned  LOS:  0 
· Dx:  Subdural Hematoma · Admitted to Hospitalist,  Neurosurgery Consulted, Urologist Consulted · COVID Testing - SARS-COV-2 
· Disposition: To be determined, message left with son Swathi Chow [phone] Reason for Admission:     
Fall in the home in the home yesterday and today RUR Score:     7%  Will assess as moderate risk due to age and reports of living alone PCP: First and Last name:  Dr. Thea Washington listed as PCP, he is cardiologist, will need to confirm Name of Practice:  
 Are you a current patient: Yes/No:  
 Approximate date of last visit:  
 Can you participate in a virtual visit if needed: Do you (patient/family) have any concerns for transition/discharge? CM needs to establish baseline, per chart lives alone in home and son lives close by. ADLS/IADL'S? Any assistance in the home, who buy groceries. PCP? Listed on face sheet as Dr. Vanessa Hernandez (cardiologist) Plan for utilizing home health: To be determined, lives in [place] Current Advanced Directive/Advance Care Plan:  Advanced Medical Directive/Living Will/Health Care Power of  scanned into chart under Media Son:  Inge Minors III - primary agent Swathi Chow is a 80year old male to [facility] after fall in the home yesterday and today. Notes indicate he was found on the floor this AM.  He was not able to bear weight and walk. ED work up completed, CT of head - new bilateral acute on chronic subdural hemorrhage. CXR, femur xray, pelvis xray, thoracic spine xray, tib fib - no acute process. Attempts to speak with son have failed, left message X 2. Care Management will continue to follow. Care Management Interventions PCP Verified by CM: No(Dr. Vanessa Hernandez listed in [address] [place] [address], Cardiologist?) Palliative Care Criteria Met (RRAT>21 & CHF Dx)?: No 
 Transition of Care Consult (CM Consult): Discharge Planning(RUR 7%   Per chart lives alone  Messages left for son, Gale Hurst [phone], Palliative Care Consult placed) Tasha Signup: No 
Discharge Durable Medical Equipment: No 
Health Maintenance Reviewed: Yes Physical Therapy Consult: No 
Occupational Therapy Consult: No 
Speech Therapy Consult: No 
Current Support Network: Lives Alone(Lives alone, son ) Padilla Deutsch RN, BSN, [facility] 
ED Care Management [phone]

## 2020-06-22 NOTE — ACP (ADVANCE CARE PLANNING)
Advance Care Planning Advance Care Planning Activator (Inpatient) Conversation Note Date of ACP Conversation: 06/22/20 Conversation Conducted with:  Unable to speak with son, left messages X 2, unable to speak with Mr. Ignacio Gaming (patient)  Reviewed scanned AMD/Living Will/Health Care POA 
 
ACP Activator: Brandyn Barrios RN Health Care Decision Maker: 
 
Current Designated Health Care Decision Maker:   Primary Decision Maker: Dion Curling - Unknown - [phone] Care Preferences *Unknown, need to verify with patient and son/primary Health Care Decision Maker* Ventilation: \"If you were in your present state of health and suddenly became very ill and were unable to breathe on your own, what would your preference be about the use of a ventilator (breathing machine) if it were available to you? \" If patient would desire the use of a ventilator (breathing machine), \"If your health worsens and it becomes clear that your chance of recovery is unlikely, what would your preference be about the use of a ventilator (breathing machine) if it were available to you? \" Would the patient desire the use of a ventilator (breathing machine)? Resuscitation \"CPR works best to restart the heart when there is a sudden event, like a heart attack, in someone who is otherwise healthy. Unfortunately, CPR does not typically restart the heart for people who have serious health conditions or who are very sick. \" \"In the event your heart stopped as a result of an underlying serious health condition, would you want attempts to be made to restart your heart (answer \"yes\" for attempt to resuscitate) or would you prefer a natural death (answer \"no\" for do not attempt to resuscitate)? \"  
 
[] Yes  [x] No   Educated Patient / Shalini Longoria regarding differences between Advance Directives and portable DNR orders. Length of ACP Conversation in minutes:   60 
 
Conversation Outcomes: [] ACP discussion completed 
[] Existing advance directive reviewed with patient; no changes to patient's previously recorded wishes 
 
 [] New Advance Directive completed 
 [] Portable Do Not Resuscitate prepared for Provider review and signature 
[] POLST/POST/MOLST/MOST prepared for Provider review and signature Follow-up plan:   
[x] Schedule follow-up conversation to continue planning 
[x] Referred individual to Provider for additional questions/concerns  
[] Advised patient/agent/surrogate to review completed ACP document and update if needed with changes in condition, patient preferences or care setting  
 
[] This note routed to one or more involved healthcare providers Received Palliative order from admitting physician. Fam Chavez, RN, BSN, [facility] Care Management [phone]

## 2020-06-22 NOTE — ROUTINE PROCESS
TRANSFER - OUT REPORT: 
 
Verbal report given to Marie Ferrer RN(name) on Dionte Lee  being transferred to [facility]) for routine progression of care Report consisted of patients Situation, Background, Assessment and  
Recommendations(SBAR). Information from the following report(s) SBAR, ED Summary, [facility] and Recent Results was reviewed with the receiving nurse. Lines:  
Peripheral IV 06/22/20 Left Forearm (Active) Site Assessment Clean, dry, & intact 6/22/2020  2:06 PM  
Phlebitis Assessment 0 6/22/2020  2:06 PM  
Infiltration Assessment 0 6/22/2020  2:06 PM  
Dressing Status Clean, dry, & intact 6/22/2020  2:06 PM  
   
Peripheral IV 06/22/20 Left Forearm (Active) Site Assessment Clean, dry, & intact 6/22/2020  6:27 PM  
Phlebitis Assessment 0 6/22/2020  6:27 PM  
Infiltration Assessment 0 6/22/2020  6:27 PM  
Dressing Status Clean, dry, & intact 6/22/2020  6:27 PM  
Hub Color/Line Status Pink 6/22/2020  6:27 PM  
   
Peripheral IV 06/22/20 Right Forearm (Active) Site Assessment Clean, dry, & intact 6/22/2020  6:28 PM  
Phlebitis Assessment 0 6/22/2020  6:28 PM  
Infiltration Assessment 0 6/22/2020  6:28 PM  
Dressing Status Clean, dry, & intact 6/22/2020  6:28 PM  
Hub Color/Line Status Pink 6/22/2020  6:28 PM  
  
 
Opportunity for questions and clarification was provided. Patient transported with: 
 [email or web address]

## 2020-06-22 NOTE — CONSULTS
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 Significant dementia per er md 
Recommend admit to 6s. Address code status and consult palliative care.

## 2020-06-22 NOTE — H&P
History & Physical 
 
Primary Care Provider: Crystal Vizcaino MD 
Source of Information: Patient History of Presenting Illness:  
Elsa Tracey is a [address] y.o. male who presents with fall Patient is extremely poor historian, I could not obtain much history from the patient. I tried calling patient's son red-brown on the telephone and left a voicemail and could not connect with him. History was primarily reviewed by chart review. Apparently patient had a fall yesterday and today. Patient lives all by himself. Was found on the floor today at approximately 8:30 AM.  Patient sure exactly what happened. Patient was not able able to bear weight and walk on his legs. Usually walks without assistance. Patient denies any other complaints or problems, although appears to be quite confused. Patient was brought to the hospital and was found to have a subdural hematoma. Patient was requested to be admitted under the hospitalist service. No further history could be obtained from the patient, no review of symptoms could be done due to his mental status. Per ER physician, the son reported to him that patient's mental status appeared to be baseline Review of Systems: 
Review of systems not obtained due to patient factors. Altered mental status Past Medical History:  
Diagnosis Date  Altered awareness, transient  Cancer ([facility] [address] Left breast Ca, Bladder Ca Past Surgical History:  
Procedure Laterality Date  HX OTHER SURGICAL Tumor resection from left breast and bladder Prior to Admission medications Not on File No Known Allergies History reviewed. No pertinent family history. SOCIAL HISTORY: 
Patient resides: 
Independently x Assisted Living SNF With family care Smoking history:  
None Former x Chronic Alcohol history:  
None Social x Chronic Ambulates:  
Independently x  
w/cane   
w/walker   
w/wc CODE STATUS: 
DNR Full x Other Objective:  
 
Physical Exam:  
 
Visit Vitals BP (!) 123/96 Pulse 93 Temp 98.8 °F (37.1 °C) Resp 19 Ht 5' 11\" (1.803 m) Wt 56.8 kg (125 lb 3.5 oz) SpO2 96% BMI 17.46 kg/m² O2 Device: Room air General : alert x 1, frail, elderly male, appears disheveled and confused HEENT: PEERL, EOMI, moist mucus membrane, TM clear Neck: supple, no JVD, no meningeal signs, full ROM Chest: Decreased basal breath sounds CVS: S1 S2 heard, Capillary refill less than 2 seconds Abd: soft/ Non tender, non distended, BS physiological, Ext: no clubbing, no cyanosis, no edema, brisk 2+ DP pulses Neuro/Psych: Limited exam as patient not cooperating. DTR 1+ to 4 Ski: warm EKG: Pending. Data Review:  
 
Recent Days: 
Recent Labs  
  06/22/20 
1404 WBC 16.2* HGB 13.6 HCT 41.4  Recent Labs  
  06/22/20 
1404   
K 4.2 * CO2 21 * BUN 61* CREA 1.62* CA 10.2* ALB 3.6 ALT 49 No results for input(s): PH, PCO2, PO2, HCO3, FIO2 in the last 72 hours. 24 Hour Results: 
Recent Results (from the past 24 hour(s)) CBC WITH AUTOMATED DIFF Collection Time: 06/22/20  2:04 PM  
Result Value Ref Range WBC 16.2 (H) 4.1 - 11.1 K/uL  
 RBC 4.35 4.10 - 5.70 M/uL  
 HGB 13.6 12.1 - 17.0 g/dL HCT 41.4 36.6 - 50.3 % MCV 95.2 80.0 - 99.0 FL  
 MCH 31.3 26.0 - 34.0 PG  
 MCHC 32.9 30.0 - 36.5 g/dL  
 RDW 14.5 11.5 - 14.5 % PLATELET 752 528 - 683 K/uL MPV 10.4 8.9 - 12.9 FL  
 NRBC 0.0 0  WBC ABSOLUTE NRBC 0.00 0.00 - 0.01 K/uL NEUTROPHILS 84 (H) 32 - 75 % LYMPHOCYTES 4 (L) 12 - 49 % MONOCYTES 12 5 - 13 % EOSINOPHILS 0 0 - 7 % BASOPHILS 0 0 - 1 % IMMATURE GRANULOCYTES 0 0.0 - 0.5 % ABS. NEUTROPHILS 13.7 (H) 1.8 - 8.0 K/UL  
 ABS. LYMPHOCYTES 0.6 (L) 0.8 - 3.5 K/UL  
 ABS. MONOCYTES 1.9 (H) 0.0 - 1.0 K/UL  
 ABS. EOSINOPHILS 0.0 0.0 - 0.4 K/UL ABS. BASOPHILS 0.0 0.0 - 0.1 K/UL  
 ABS. IMM. GRANS. 0.0 0.00 - 0.04 K/UL  
 DF SMEAR SCANNED    
 RBC COMMENTS NORMOCYTIC, NORMOCHROMIC METABOLIC PANEL, COMPREHENSIVE Collection Time: 06/22/20  2:04 PM  
Result Value Ref Range Sodium 140 136 - 145 mmol/L Potassium 4.2 3.5 - 5.1 mmol/L Chloride 110 (H) 97 - 108 mmol/L  
 CO2 21 21 - 32 mmol/L Anion gap 9 5 - 15 mmol/L Glucose 126 (H) 65 - 100 mg/dL BUN 61 (H) 6 - 20 MG/DL Creatinine 1.62 (H) 0.70 - 1.30 MG/DL  
 BUN/Creatinine ratio 38 (H) 12 - 20 GFR est AA 48 (L) >60 ml/min/1.73m2 GFR est non-AA 40 (L) >60 ml/min/1.73m2 Calcium 10.2 (H) 8.5 - 10.1 MG/DL Bilirubin, total 0.8 0.2 - 1.0 MG/DL  
 ALT (SGPT) 49 12 - 78 U/L  
 AST (SGOT) 207 (H) 15 - 37 U/L Alk. phosphatase 127 (H) 45 - 117 U/L Protein, total 7.8 6.4 - 8.2 g/dL Albumin 3.6 3.5 - 5.0 g/dL Globulin 4.2 (H) 2.0 - 4.0 g/dL A-G Ratio 0.9 (L) 1.1 - 2.2 URINALYSIS W/MICROSCOPIC Collection Time: 06/22/20  2:04 PM  
Result Value Ref Range Color YELLOW/STRAW Appearance CLOUDY (A) CLEAR Specific gravity 1.021 1.003 - 1.030    
 pH (UA) 5.0 5.0 - 8.0 Protein 30 (A) NEG mg/dL Glucose Negative NEG mg/dL Ketone Negative NEG mg/dL Bilirubin Negative NEG Blood LARGE (A) NEG Urobilinogen 0.2 0.2 - 1.0 EU/dL Nitrites Negative NEG Leukocyte Esterase Negative NEG    
 WBC 0-4 0 - 4 /hpf  
 RBC 0-5 0 - 5 /hpf Epithelial cells FEW FEW /lpf Bacteria 1+ (A) NEG /hpf URINE CULTURE HOLD SAMPLE Collection Time: 06/22/20  2:04 PM  
Result Value Ref Range Urine culture hold Urine on hold in Microbiology dept for 2 days. If unpreserved urine is submitted, it cannot be used for addtional testing after 24 hours, recollection will be required. CK  
 Collection Time: 06/22/20  2:04 PM  
Result Value Ref Range CK 6,935 (H) 39 - 308 U/L Imaging:  
 
  
 Exam: Noncontrast CT of the brain is performed with 5 mm collimation. 
  
CT dose reduction was achieved with the use of the standardized protocol 
tailored for this examination and automatic exposure control for dose 
modulation. Adaptive statistical iterative reconstruction (ASIR) was utilized. 
  
Direct comparison is made to prior CT dated April 2019. 
  
FINDINGS: There are acute on chronic bilateral convexity subdural hemorrhages, 
new compared to prior CT dated April 2019. Right convexity subdural collection 
measures 1.5 cm in greatest transverse dimension. Left convexity subdural 
collection measures 1.8 cm in greatest transverse dimension. While there is no 
subfalcine herniation, there is some mild bilateral sulcal effacement. There is 
no evidence of acute territorial infarct. No intracranial mass is visualized. The mastoid air cells are well pneumatized. There is mild mucosal thickening and 
an air-fluid level within the left maxillary sinus, new compared to prior CT 
dated April 2019. 
  
IMPRESSION IMPRESSION: New, bilateral acute on chronic subdural hemorrhage. No herniation. EXAM:  CT LUMBAR SPINE WITHOUT  CONTRAST 
  
INDICATION: fall. 
  
COMPARISON: None. 
  
CONTRAST:  None. 
  
TECHNIQUE: Multislice helical CT of the lumbar spine was performed without 
intravenous contrast administration. Coronal and sagittal reformats were 
generated. CT dose reduction was achieved through use of a standardized 
protocol tailored for this examination and automatic exposure control for dose 
modulation. Adaptive statistical iterative reconstruction (ASIR) was utilized. 
  
FINDINGS: 
  
The bones are osteopenic. There is no fracture or compression deformity. There 
is chronic deformity of the posterior spinous processes at L4 and L5 related to 
prior laminectomies. 
  
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 There is a right internal iliac artery aneurysm measuring 3.5 cm. The bladder is significantly 
distended. There is moderate right hydroureteronephrosis. There is mild 
dilatation of the distal left ureter. The ureterovesicular junctions are not 
imaged. Several nonobstructive stones are seen in the right kidney. Atherosclerotic consultations are seen in the left renal hilum. 
  
Lower thoracic spine: No herniation or stenosis. 
  
L1-L2: There is no spinal canal or neural foraminal stenosis. Mild bilateral 
posterior facet arthropathy. 
  
L2-L3: There is no spinal canal or neural foraminal stenosis. Severe bilateral 
posterior facet arthropathy. 
  
L3-L4: There is no spinal canal or neural foraminal stenosis. Mild bilateral 
posterior facet arthropathy. 
  
L4-L5: Moderate disc space narrowing. No significant spinal stenosis. There is 
mild right and moderate left neural foraminal narrowing. 
  
L5-S1: Moderate disc space narrowing. No significant spinal stenosis or neural 
foraminal narrowing. . There is osseous fusion of the posterior elements. 
  
IMPRESSION IMPRESSION: 
  
1. No acute abnormality. 2. Prior laminectomies at L5-S1. 
3. Spondylosis as above. 4. 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 5. Massively distended bladder. 6. Moderate right hydroureteronephrosis. Mild distention of the distal left 
ureter. The ureterovesicular junctions are not completely imaged. Several small 
nonobstructive stones are seen in the right kidney. Assessment: 1. Subdural hematoma: Likely traumatic, patient will be admitted on a telemetry bed, appreciate neurosurgery input, neurovascular checks, optimize blood pressure control, keep systolic less than 545, repeat CT in the morning, Keppra IV for seizure prophylaxis, any changes in neurological status will mandate emergent intervention, will discuss with son regarding goals of care and CODE STATUS once able to connect with him. Closely monitor. 2.  Rhabdomyolysis: Likely traumatic, IV hydration, serial CK checks, closely monitor renal function, supportive care and close monitoring on telemetry, further intervention per hospital course 3. SANDRA: CT scan with hydroureteronephrosis, IV hydration, urology consult, may consider surgical intervention will discuss with urology, supportive care and close monitoring further intervention per hospital course reassess as needed, empirical antibiotics. Will place a Rocha. 4.  Leukocytosis: Unclear etiology, empirical IV antibiotics, urine culture pending, afebrile, blood cultures, COVID-19 testing, supportive care and close monitoring 5. Dehydration: IV hydration repeat labs in the morning. 6.  Elevated LFT: Unclear etiology, right upper quadrant ultrasound pending, patient with no pain on abdominal exam, will trend, avoid hepatotoxic medication, supportive care and close monitoring. CODE STATUS: Full code GI DVT prophylaxis: SCDs Signed By: Everette Sandhu MD   
 June 22, 2020

## 2020-06-22 NOTE — ED TRIAGE NOTES
Pt to ED from home with cc of witnessed ground level fall. Denies LOC and hitting head. Not on blood thinners. Pt son says he has increased generalized weakness since the fall. Pt denies pain at this time. Pt has pmh of dementia and is at baseline per son/EMS.

## 2020-06-22 NOTE — PROGRESS NOTES
MEDICATION RECONCILIATION Called ED to see if patient has anyone at bedside given patient is disoriented. No family present. Called patient's son who is his emergency contact, but no one picked up. Will try again later. Patient Information Patient Name Dahlia Osman Sex Male  
3/15/1923 [facility] 
 Patient Demographics Address [address] [place] Phone [phone] (Home) Emergency Contact(s) Name Relation Home Work Mobile Kaden Jose Unknown [phone]

## 2020-06-22 NOTE — ROUTINE PROCESS
TRANSFER - OUT REPORT: 
 
Verbal report given to dario RN (name) on Ward Cueva  being transferred to 6s(unit) for routine progression of care Report consisted of patients Situation, Background, Assessment and  
Recommendations(SBAR). Information from the following report(s) SBAR and ED Summary was reviewed with the receiving nurse. Lines:  
Peripheral IV 06/22/20 Left Forearm (Active) Site Assessment Clean, dry, & intact 6/22/2020  2:06 PM  
Phlebitis Assessment 0 6/22/2020  2:06 PM  
Infiltration Assessment 0 6/22/2020  2:06 PM  
Dressing Status Clean, dry, & intact 6/22/2020  2:06 PM  
  
 
Opportunity for questions and clarification was provided.    
 
Patient transported with: 
 TEch

## 2020-06-23 NOTE — CONSULTS
Urology Consult Patient: Kala Mercedes MRN: [id number]  SSN: [id number] YOB: 1923  Age: 80 y.o. Sex: male Date of Consultation:  2020 Requesting Physician: Mathew Mayers MD 
Reason for Consultation:  Radha Abreus History of Present Illness:  Kala Mercedes is a 80 y.o. male admitted 2020 to the hospital for SDH (subdural hematoma) ([facility] [address] [S06.5X9A]. He is sleeping this morning , easily aroused. Pt admitted s/p fall at home and hydro noted incidentally on CT . Alberto placed in ER draining clear urine . Cr 1.47 improved with alberto placement  . Will continue to assess. Past Medical History:  
Diagnosis Date  Altered awareness, transient  Cancer ([facility] [address] Left breast Ca, Bladder Ca Past Surgical History:  
Procedure Laterality Date  HX OTHER SURGICAL Tumor resection from left breast and bladder No Known Allergies Prior to Admission medications Not on File Social History Tobacco Use  Smoking status: Current Every Day Smoker  Smokeless tobacco: Never Used  Tobacco comment: Smokes a pipe Substance Use Topics  Alcohol use: Yes Alcohol/week: 14.0 standard drinks Types: 14 Shots of liquor per week History reviewed. No pertinent family history. ROS:  Admission ROS by Marisa Gonzalez MD from 2020 was reviewed and changes (other than per HPI) include: none. Physical Exam: 
 
Vital Signs:  Temp (24hrs), Av °F (36.7 °C), Min:97 °F (36.1 °C), Max:99.1 °F (37.3 °C) Blood pressure 115/65, pulse 62, temperature 97 °F (36.1 °C), resp. rate 16, height 5' 11\" (1.803 m), weight 57.2 kg (126 lb), SpO2 98 %. I&O's:  No intake/output data recorded. Appearance: frail NAD Conjunctiva/Lids: conjunctivae and lids normal  
Pupil/Iris: pupils equal, round External Ears/Nose: normal no lesions or deformities Hearing: [facility] Neck: supple Thyroid: no palpable enlargement Respiratory Effort: breathing easily Abd. Aorta: no palpable enlargement Lower Extremity: no edema Abdomen/Flank: soft non-tender without masses; no CVA tenderness Liver/Spleen: no organomegaly Hernia: no ventral hernia Lymph: no adenopathy  
Gait/Station: not assesed Digits/Nails: no clubbing cyanosis petechiae Skin Inspection: warm and dry Skin Palpation: no SQ nodularity Sensation: no sensory deficits Judgment/Insight: periods of confusion Mood/Affect: normal 
 
 
Lab Review: CBC Recent Labs  
  06/23/20 
0440 06/22/20 
1404 WBC 14.4* 16.2* HGB 12.8 13.6 HCT 39.1 41.4  268 CMP Recent Labs  
  06/23/20 
0208 06/22/20 
1404  140  
K 3.9 4.2 * 110* CO2 20* 21  
GLU 88 126* BUN 64* 61* CREA 1.47* 1.62* CA 9.4 10.2* ALB  --  3.6 TBILI  --  0.8 ALT  --  49 Other Recent Labs  
  06/23/20 
0208 INR 1.0  
 
 
UA:  
Lab Results Component Value Date/Time Color YELLOW/STRAW 06/22/2020 02:04 PM  
 Appearance CLOUDY (A) 06/22/2020 02:04 PM  
 Specific gravity 1.021 06/22/2020 02:04 PM  
 pH (UA) 5.0 06/22/2020 02:04 PM  
 Protein 30 (A) 06/22/2020 02:04 PM  
 Glucose Negative 06/22/2020 02:04 PM  
 Ketone Negative 06/22/2020 02:04 PM  
 Bilirubin Negative 06/22/2020 02:04 PM  
 Urobilinogen 0.2 06/22/2020 02:04 PM  
 Nitrites Negative 06/22/2020 02:04 PM  
 Leukocyte Esterase Negative 06/22/2020 02:04 PM  
 Epithelial cells FEW 06/22/2020 02:04 PM  
 Bacteria 1+ (A) 06/22/2020 02:04 PM  
 WBC 0-4 06/22/2020 02:04 PM  
 RBC 0-5 06/22/2020 02:04 PM  
 
 
Cultures:   
 
Imaging:   
IMPRESSION IMPRESSION: 
  
1. No acute abnormality. 2. Prior laminectomies at L5-S1. 
3. Spondylosis as above. 4. 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 5. Massively distended bladder. 6. Moderate right hydroureteronephrosis. Mild distention of the distal left ureter. The ureterovesicular junctions are not completely imaged. Several small 
nonobstructive stones are seen in the right kidney. CT:  
Results for orders placed during the hospital encounter of 06/22/20 CT SPINE CERV WO CONT Narrative INDICATION:  fall STUDY:  CT SPINE CERV WO CONT Examination: Cervical spine CT. No contrast or comparisons. Thin section axial 
images were obtained with sagittal and coronal reformats. CT dose reduction was 
achieved through use of a standardized protocol tailored for this examination 
and automatic exposure control for dose modulation. FINDINGS: Alignment of the cervical spine is normal. There is ankylosis of all 
cervical and upper thoracic levels. Bone mineral density is decreased. Degenerative changes between C1 and C2. No acute fracture. Severe emphysematous 
change and vascular calcifications. Impression IMPRESSION: 
1. Osteopenia and bony ankylosis as above. No acute fracture Assessment:  
 
Active Problems: 
  SDH (subdural hematoma) ([address] (6/22/2020) Plan: 1. R hydro - noted on CT with distended bladder . Rocha placed will re image in 48 hours to assess resolution of hydro . Plan discussed with Dr. Francia Kilgore Signed By: Cait Gallego NP  - June 23, 2020

## 2020-06-23 NOTE — PROGRESS NOTES
Spiritual Care Assessment/Progress Note [address] 
 
 
NAME: Florencio Gilford      MRN: [id number] AGE: 80 y.o. SEX: male Yarsani Affiliation: Other Language: English  
 
6/23/2020     Total Time (in minutes): 5 Spiritual Assessment begun in [facility] 4 IMCU through conversation with: 
  
    []Patient        [] Family    [] Friend(s) Reason for Consult: Palliative Care, Initial/Spiritual Assessment Spiritual beliefs: (Please include comment if needed) 
   [] Identifies with a luciano tradition:     
   [] Supported by a luciano community:        
   [] Claims no spiritual orientation:       
   [] Seeking spiritual identity:            
   [] Adheres to an individual form of spirituality:       
   [x] Not able to assess:                   
 
    
Identified resources for coping:  
   [] Prayer                           
   [] Music                  [] Guided Imagery 
   [] Family/friends                 [] Pet visits [] Devotional reading                         [] Unknown 
   [] Other:                                         
 
 
Interventions offered during this visit: (See comments for more details) Patient Interventions: Initial visit Plan of Care: 
 
 [] Support spiritual and/or cultural needs  
 [] Support AMD and/or advance care planning process    
 [] Support grieving process 
 [] Coordinate Rites and/or Rituals  
 [] Coordination with community clergy [] No spiritual needs identified at this time 
 [] Detailed Plan of Care below (See Comments)  [] Make referral to Music Therapy 
[] Make referral to Pet Therapy    
[] Make referral to Addiction services 
[] Make referral to [facility] 
[] Make referral to Spiritual Care Partner 
[] No future visits requested       
[x] Follow up visits as needed Pt is a new palliative consult. Attempted to visit pt. Pt on isolation and not alert at this time. Chaplains will continue to offer support as needed. Chaplain Stone MDiv, MS, [facility] 
[phone])

## 2020-06-23 NOTE — CONSULTS
Palliative Medicine Consult Aaron: [phone]) Patient Name: Shasta Leblanc YOB: 1923 Date of Initial Consult: 6/23/2020 Reason for Consult: care decisions Requesting Provider: Dr. Alejandra Esparza Primary Care Physician: Radha Nichols MD 
 
 SUMMARY:  
Shasta Leblanc is a 80 y.o. with a past history of early dementia still independent in ADLs, living alone, resection of bladder tumor 2009, BPH, who was admitted on 6/22/2020 after found by EMS  with a diagnosis of AMS . Current medical issues leading to Palliative Medicine involvement include: Acute on chronic subdural hematoma, patient unresponsive Patient lives in his own home with in law suite adjoined that has tenant/friend who checks on patient and also family nearby -- Son Suzette Hancock (lives in [facility]) and checks on his dad frequently. Patient was still driving short distances (family has now taken keys and told him he can't drive any more) Patient is a retired salesman for [employer]:  
1. Delirium, metabolic encephalopathy 2. Patient does not demonstrate decision making capacity at this time. 3. Early dementia 4. Tobacco use 5. Daily alcohol use (3 bourbons and water each night) PLAN:  
1. Patient is nonverbal and unable to provide any history 2. Several calls made to his son Suzette Hancock III, left voicemail, awaiting callback. 3. Per review of palliative medicine consult in April 2019, patient has a D DNR at home per son but it was never brought to the hospital or scanned in. He does have an AMD in chart naming his son as his medical power of . 4. We will wait for son to call back to clarify CODE STATUS and encourage comfort oriented care with the support of hospice at home. 5. Initial consult note routed to primary continuity provider and/or primary health care team members 6.  Communicated plan of care with: Palliative IDT, [facility] Team, primary RN 
 
 GOALS OF CARE / TREATMENT PREFERENCES:  
 
GOALS OF CARE: 
Patient/Health Care Proxy Stated Goals: Comfort TREATMENT PREFERENCES:  
Code Status: Full Code Advance Care Planning: 
[x] The [facility] Interdisciplinary Team has updated the ACP Navigator with Annabella and Patient Capacity Advance Care Planning 6/22/2020 Patient's Healthcare Decision Maker is: Named in scanned ACP document Confirm Advance Directive Yes, on file Medical Interventions: Comfort measures Other Instructions: Other: As far as possible, the palliative care team has discussed with patient / health care proxy about goals of care / treatment preferences for patient. HISTORY:  
 
History obtained from: chart, son CHIEF COMPLAINT: found altered HPI/SUBJECTIVE: The patient is:  
[] Verbal and participatory [x] Non-participatory due to:  
  
Patient laying in bed comfortably, unable to provide any history. Clinical Pain Assessment (nonverbal scale for severity on nonverbal patients):  
Clinical Pain Assessment Severity: 0 Activity (Movement): Lying quietly, normal position Duration: for how long has pt been experiencing pain (e.g., 2 days, 1 month, years) Frequency: how often pain is an issue (e.g., several times per day, once every few days, constant) FUNCTIONAL ASSESSMENT:  
 
Palliative Performance Scale (PPS): PPS: 40 PSYCHOSOCIAL/SPIRITUAL SCREENING:  
 
Palliative IDT has assessed this patient for cultural preferences / practices and a referral made as appropriate to needs (Cultural Services, Patient Advocacy, Ethics, etc.) Any spiritual / Confucianism concerns: 
[] Yes /  [x] No 
 
Caregiver Burnout: 
[] Yes /  [x] No /  [] No Caregiver Present Anticipatory grief assessment:  
[x] Normal  / [] Maladaptive ESAS Anxiety: ESAS Depression:    
 
 
 REVIEW OF SYSTEMS:  
 
Positive and pertinent negative findings in ROS are noted above in HPI. The following systems were [x] reviewed / [] unable to be reviewed as noted in HPI Other findings are noted below. Systems: constitutional, ears/nose/mouth/throat, respiratory, gastrointestinal, genitourinary, musculoskeletal, integumentary, neurologic, psychiatric, endocrine. Positive findings noted below. Modified ESAS Completed by: provider Pain: 0 Dyspnea: 0 PHYSICAL EXAM:  
 
From RN flowsheet: 
Wt Readings from Last 3 Encounters:  
06/23/20 126 lb (57.2 kg) 04/25/19 138 lb 14.2 oz (63 kg) Blood pressure 115/65, pulse 62, temperature 97 °F (36.1 °C), resp. rate 16, height 5' 11\" (1.803 m), weight 126 lb (57.2 kg), SpO2 98 %. Pain Scale 1: Numeric (0 - 10) Pain Intensity 1: 0 Last bowel movement, if known:  
 
Constitutional: Sleeping  
eyes: n/a 
ENMT: no nasal discharge, moist mucous membranes Cardiovascular: regular rhythm, distal pulses intact Respiratory: breathing not labored, symmetric Gastrointestinal: soft non-tender, +bowel sounds Musculoskeletal: Chronic disuse atrophy  
skin: warm, dry Neurologic: Unconscious HISTORY:  
 
Active Problems: 
  SDH (subdural hematoma) ([facility] [address] (6/22/2020) Past Medical History:  
Diagnosis Date  Altered awareness, transient  Cancer ([address] Left breast Ca, Bladder Ca Past Surgical History:  
Procedure Laterality Date  HX OTHER SURGICAL Tumor resection from left breast and bladder History reviewed. No pertinent family history. History reviewed, no pertinent family history. Social History Tobacco Use  Smoking status: Current Every Day Smoker  Smokeless tobacco: Never Used  Tobacco comment: Smokes a pipe Substance Use Topics  Alcohol use: Yes Alcohol/week: 14.0 standard drinks Types: 14 Shots of liquor per week No Known Allergies Current Facility-Administered Medications Medication Dose Route Frequency  ondansetron (ZOFRAN) injection 4 mg  4 mg IntraVENous Q6H PRN  
 naloxone (NARCAN) injection 0.4 mg  0.4 mg IntraVENous PRN  
 0.9% sodium chloride infusion  75 mL/hr IntraVENous CONTINUOUS  
 levETIRAcetam (KEPPRA) 500 mg in 0.9% sodium chloride 100 mL IVPB  500 mg IntraVENous Q12H  
 acetaminophen (TYLENOL) tablet 650 mg  650 mg Oral Q6H PRN  
 hydrALAZINE (APRESOLINE) 20 mg/mL injection 10 mg  10 mg IntraVENous Q6H PRN  
 cefTRIAXone (ROCEPHIN) 1 g in 0.9% sodium chloride (MBP/ADV) 50 mL  1 g IntraVENous Q24H  
 famotidine (PF) (PEPCID) 20 mg in 0.9% sodium chloride 10 mL injection  20 mg IntraVENous Q24H  
 
 
 
 LAB AND IMAGING FINDINGS:  
 
Lab Results Component Value Date/Time WBC 14.4 (H) 06/23/2020 04:40 AM  
 HGB 12.8 06/23/2020 04:40 AM  
 PLATELET 979 33/12/5125 04:40 AM  
 
Lab Results Component Value Date/Time Sodium 142 06/23/2020 02:08 AM  
 Potassium 3.9 06/23/2020 02:08 AM  
 Chloride 112 (H) 06/23/2020 02:08 AM  
 CO2 20 (L) 06/23/2020 02:08 AM  
 BUN 64 (H) 06/23/2020 02:08 AM  
 Creatinine 1.47 (H) 06/23/2020 02:08 AM  
 Calcium 9.4 06/23/2020 02:08 AM  
 Magnesium 2.4 04/24/2019 04:32 AM  
 Phosphorus 3.4 04/24/2019 04:32 AM  
  
Lab Results Component Value Date/Time Alk. phosphatase 127 (H) 06/22/2020 02:04 PM  
 Protein, total 7.8 06/22/2020 02:04 PM  
 Albumin 3.6 06/22/2020 02:04 PM  
 Globulin 4.2 (H) 06/22/2020 02:04 PM  
 
Lab Results Component Value Date/Time INR 1.0 06/23/2020 02:08 AM  
 Prothrombin time 10.4 06/23/2020 02:08 AM  
 aPTT 28.2 06/23/2020 02:08 AM  
  
Lab Results Component Value Date/Time Ferritin 559 (H) 06/22/2020 02:04 PM  
  
No results found for: PH, PCO2, PO2 No components found for: [place] Lab Results Component Value Date/Time CK 6,021 (H) 06/23/2020 02:08 AM  
 CK - MB 6.9 (H) 11/16/2009 03:35 AM  
  
 
 
   
 
Total time: 50 minutes Counseling / coordination time, spent as noted above: 50 minutes > 50% counseling / coordination?:  Yes Prolonged service was provided for  []30 min   []75 min in face to face time in the presence of the patient, spent as noted above. Time Start:  
Time End:  
Note: this can only be billed with 25079 (initial) or 81692 (follow up). If multiple start / stop times, list each separately.

## 2020-06-23 NOTE — WOUND CARE
Wound Consult:  New patient Visit. Chart reviewed. Consulted for abrasions on knee, heel and sacrum on admission. In to assess patient who is resting on a Versacare bed with accumax mattress. He is pleasantly confused at time of visit; unable to turn himself - max assist of one; once on side used pillow to prop and between knees. He fell at home prior to admission. Assessment: 
Heels blanching redness of intact skin. Sacrum - no redness noted to area, no redness to buttocks or back/spine. Lateral left knee area - two adjacent abrasions - 3 x 2 cm and 2 x 2 cm, dry exudate covering, no surrounding redness. Right posterior knee area - 1 cm dry abrasion. Treatment: 
Hydrocolloid placed to left lateral knee abrasions. Turned and repositioned. Wound Recommendations: 
Hydrocolloid to left lateral knee abrasions; change weekly and as needed. Skin Care Recommendations: 1. Minimize friction/shear: minimize layers of linen/pads under patient. 2. Off load pressure/reposition: continue to turn and reposition approximately every 2 hours; float heels; waffle cushion for sitting. 3. Manage Moisture - keep skin folds dry; incontinence skin care as needed; appropriate sized briefs if needed; alberto in use currently. 4. Continue to monitor nutrition, pain, and skin risk scale, and skin assessment. Plan: Will discuss with Dr. Alina Qureshi regarding findings and obtained orders for treatment. We will continue to reassess routinely and as needed. Shazia Horvath RN,[facility] Wound Healing Office [phone] Pager [phone]

## 2020-06-23 NOTE — PROGRESS NOTES
Problem: Falls - Risk of 
Goal: *Absence of Falls Description: Document Wilma Millard Fall Risk and appropriate interventions in the flowsheet. Outcome: Progressing Towards Goal 
Note: Fall Risk Interventions: 
Mobility Interventions: Bed/chair exit alarm, PT Consult for mobility concerns Mentation Interventions: Bed/chair exit alarm Medication Interventions: Bed/chair exit alarm Elimination Interventions: Bed/chair exit alarm History of Falls Interventions: Bed/chair exit alarm

## 2020-06-23 NOTE — PROGRESS NOTES
COVID rule out test pending. NSG consult complete. Urology to see on 6/23. Rocha in place. Follow AM labs. Continue Q1h Neuro check and vitals.

## 2020-06-23 NOTE — PROGRESS NOTES
Physical Therapy: Hold Orders acknowledged, chart reviewed, discussed with Rn in prep for PT eval.  Rn reports pt is not following commands, is pulling at lines, and is not appropriate for PT eval at this time. Will hold at this time and continue to follow.    
 
Ronald Gibbs, PT, DPT

## 2020-06-23 NOTE — PROGRESS NOTES
TRANSFER - IN REPORT: 
 
Verbal report received from [facility]) on Kala Mercedes  being received from [facility]) for routine progression of care Report consisted of patients Situation, Background, Assessment and  
Recommendations(SBAR). Information from the following report(s) SBAR, Kardex, Intake/Output, MAR and Recent Results was reviewed with the receiving nurse Wicho Dai RN. Opportunity for questions and clarification was provided. Assessment completed upon patients arrival to unit and care assumed.

## 2020-06-23 NOTE — PROGRESS NOTES
Occupational Therapy Order received and chart reviewed, noted palliative consult and patient unable to participate at this time. Will follow up as indicated/appropriate.  
Governor LITA MontoyaR/BONNIE

## 2020-06-23 NOTE — PROGRESS NOTES
TRANSFER - OUT REPORT: 
 
Verbal report given to [facility]) on Kristen Gilbert  being transferred to (unit) for routine progression of care Report consisted of patients Situation, Background, Assessment and  
Recommendations(SBAR). Information from the following report(s) SBAR was reviewed with the receiving nurse. Lines:  
Peripheral IV 06/22/20 Left Forearm (Active) Site Assessment Clean, dry, & intact 6/23/2020  4:00 PM  
Phlebitis Assessment 0 6/23/2020  4:00 PM  
Infiltration Assessment 0 6/23/2020  4:00 PM  
Dressing Status Clean, dry, & intact 6/23/2020  4:00 PM  
Dressing Type Transparent;Tape 6/23/2020  4:00 PM  
Hub Color/Line Status Blue; Infusing 6/23/2020  4:00 PM  
Action Taken Open ports on tubing capped 6/23/2020  4:00 PM  
Alcohol Cap Used Yes 6/23/2020  4:00 PM  
   
Peripheral IV 06/22/20 Left Forearm (Active) Site Assessment Clean, dry, & intact 6/23/2020  4:00 PM  
Phlebitis Assessment 0 6/23/2020  4:00 PM  
Infiltration Assessment 0 6/23/2020  4:00 PM  
Dressing Status Clean, dry, & intact 6/23/2020  4:00 PM  
Dressing Type Transparent;Tape 6/23/2020  4:00 PM  
Hub Color/Line Status Pink;Capped 6/23/2020  4:00 PM  
Action Taken Open ports on tubing capped 6/23/2020  4:00 PM  
Alcohol Cap Used Yes 6/23/2020  4:00 PM  
   
Peripheral IV 06/22/20 Right Forearm (Active) Site Assessment Clean, dry, & intact 6/23/2020  4:00 PM  
Phlebitis Assessment 0 6/23/2020  4:00 PM  
Infiltration Assessment 0 6/23/2020  4:00 PM  
Dressing Status Clean, dry, & intact 6/23/2020  4:00 PM  
Dressing Type Transparent;Tape 6/23/2020  4:00 PM  
Hub Color/Line Status Pink;Capped 6/23/2020  4:00 PM  
Action Taken Open ports on tubing capped 6/23/2020  4:00 PM  
Alcohol Cap Used Yes 6/23/2020  4:00 PM  
  
 
Opportunity for questions and clarification was provided. Patient transported with: 
 Registered Nurse Patients majo Gilbert III updated on patients new location.

## 2020-06-23 NOTE — PROGRESS NOTES
NUTRITION COMPLETE ASSESSMENT 
 
RECOMMENDATIONS:  
Await goals of care decisions Interventions/Plan:  
Food/Nutrient Delivery:  Modify diet/texture/consistency/nutrients Commercial supplement Assessment:  
Reason for Assessment: MST Referral - unsure wt loss Diet: 2 gm Na+, mech soft Supplements: none at this time Meal Intake: No data found. Nutritionally Significant Medications:  
NS @ 75 mL/hr, Pepcid, Keppra Subjective:  
Assessment completed by chart review Objective: 
80 y.o. male admitted with SDH (subdural hematoma) ([facility] [address] [S06.5X9A] Pt has a past medical history of Altered awareness, transient and Cancer ([facility] [address] Pt minimally responsive. Has underlying dementia, unclear baseline. Palliative care involved trying to arrange meeting with son to discuss goals and code status. Currently pt is full code. Unable to obtain wt hx, but appears he has lost at least 12 lb since last year. Pt on low Na+, mechanical soft diet, but has not been alert enough to take PO. Supplement variety (Ensure Compact, Magic Cup, Boost Pudding) added with meals for ease/concentration of nutrition when pt is alert enough. No need for low Na+ restriction. RD will continue to follow for ability to take PO and goals. Wt Readings from Last 20 Encounters:  
06/23/20 57.2 kg (126 lb) 04/25/19 63 kg (138 lb 14.2 oz) Intake/Output: 
 
Intake/Output Summary (Last 24 hours) at 6/23/2020 1724 Last data filed at 6/23/2020 1424 Gross per 24 hour Intake 635 ml Output 850 ml Net -215 ml Last BM: PTA Physical Findings: 
 
Edema: none Skin Integrity: abrassions to bilat knees, otherwise intact Nutrition focused physical exam: deferred at this time Anthropometrics: 
Weight Loss Metrics 6/23/2020 4/25/2019 Today's Wt 126 lb 138 lb 14.2 oz BMI 17.57 kg/m2 - Weight Source: Bed Height: 5' 11\" (180.3 cm), Body mass index is 17.57 kg/m². IBW : 78 kg (172 lb), % IBW (Calculated): 73.26 % 
 ,   
 
Labs:   
 
Recent Labs  
  06/23/20 
0208 06/22/20 
1404 GLU 88 126* BUN 64* 61* CREA 1.47* 1.62*  140  
K 3.9 4.2 * 110* CO2 20* 21  
CA 9.4 10.2* Recent Labs  
  06/23/20 
0208 06/22/20 
1404 ALT 51 49 * 127* TBILI 0.6 0.8 TP 6.6 7.8 ALB 3.2* 3.6 GLOB 3.4 4.2* No results for input(s): LAC in the last 72 hours. Recent Labs  
  06/23/20 
0440 06/22/20 
1404 WBC 14.4* 16.2* HGB 12.8 13.6 HCT 39.1 41.4  268 No results for input(s): PREALB in the last 72 hours. Recent Labs  
  06/23/20 
0208 TRIGL 80 No results for input(s): GLUCPOC in the last 72 hours. No results found for: HBA1C, HGBE8, GGU4REPH, WDF9JHUW, OVU0YAYP Cultural, Adventism and ethnic food preferences identified: None Food Allergies: NKFA Diet Restrictions: Cultural/Moravian Preference(s): None Estimated Nutrition Needs:  
Kcals/day: 1700 Kcals/day Protein: 70 g(1.2 gm/kg) Fluid: 1700 ml(1 mL/kcal) Based On: Kcal/kg - specify (Comment)(30+ kcal/kg) Weight Used: Actual wt(57.2 kg) Pt expected to meet estimated nutrient needs:  Not at this time Nutrition Diagnosis:  
1. Inadequate oral intake related to decreased responsiveness as evidenced by unable to take PO Goals:   
 PO >25% of meals with 1-2 supplements daily over next 3-4 days Monitoring & Evaluation: Total energy intake, Protein intake, Liquid meal replacement, Oral fluids amount Weight/weight change, Electrolyte and renal profile, GI, CV-pulmonary Behavior Previous Nutrition Goals Met:   N/A Previous Recommendations:    N/A Education & Discharge Needs: 
 [x] None Identified or too early to determine 
 [] Identified and addressed - see intervention/plan 
 [x] Participated in care plan, discharge planning, and/or interdisciplinary rounds Josey Lowry RD Available via clipkit Or Pager# [phone]

## 2020-06-23 NOTE — PROGRESS NOTES
Hospitalist Progress Note Hospital summary: Colton Campuzano is a 80 y.o. male who presents with fall 
  
Patient is extremely poor historian, I could not obtain much history from the patient. I tried calling patient's son Myron Butcher on the telephone and left a voicemail and could not connect with him. History was primarily reviewed by chart review. Apparently patient had a fall yesterday and today. Patient lives all by himself. Was found on the floor today at approximately 8:30 AM.  Patient sure exactly what happened. Patient was not able able to bear weight and walk on his legs. Usually walks without assistance. Patient denies any other complaints or problems, although appears to be quite confused. Patient was brought to the hospital and was found to have a subdural hematoma. Patient was requested to be admitted under the hospitalist service. No further history could be obtained from the patient, no review of symptoms could be done due to his mental status. Per ER physician, the son reported to him that patient's mental status appeared to be  6/22/2020 Assessment/Plan: 
Acute on chronic Subdural hematoma S/p fall 
-  CT head: New, bilateral acute on chronic subdural hemorrhage. No herniation - Appreciate NSGY input 
- neuro checks  
- on Keppra for seizure ppx Acute metabolic encephalopathy Underlying dementia 
- unclear baseline Traumatic Rhabdomyolysis: improving 
- IVF 
- serial CK checks SANDRA - likely sec to rhabdo + obstructive uropathy - improving 
- CT: Moderate right hydroureteronephrosis. Mild distention of the distal left ureter. - urology on board 
- alberto placed - IVF. dced abx as urine cx negative - will monitor renal function Leukocytosis  - improving 
- could be due to dehydration and rhabdo -  COVID-19 test pending  
- blood cx: NGTD 
- resp pcr negative - CXR: No acute cardiopulmonary disease 
- will monitor Elevated LFTs:  
- could be due to rhabdo 
- acute hepatitis panel ordered 
-   right upper quadrant ultrasound pending 
- will monitor Palliative care on board Code status: Full. Son Jeri Colin DVT prophylaxis: SCDs Disposition: TBD 
---------------------------------------------- 
 
CC: Fall S: Patient is seen and examined at bedside this morning. He was responsive only to pain stimuli. Not talking at all. Unable to reach son. Review of Systems: 
Review of systems not obtained due to patient factors. O: 
Visit Vitals /61 (BP 1 Location: Left arm, BP Patient Position: At rest) Pulse 60 Temp 97 °F (36.1 °C) Resp 15 Ht 5' 11\" (1.803 m) Wt 57.2 kg (126 lb) SpO2 97% BMI 17.57 kg/m² PHYSICAL EXAM: 
Gen: NAD HEENT:  MM dry Neck: supple Heart: RRR, no MRG, no peripheral edema Lungs: CTA b/l, non-labored respirations Abd: soft, NT, ND, BS+ Extr: warm Skin: dry, no rash Neuro: unresponsive Intake/Output Summary (Last 24 hours) at 6/23/2020 1405 Last data filed at 6/23/2020 0400 Gross per 24 hour Intake 635 ml Output 500 ml Net 135 ml Recent labs & imaging reviewed: 
Recent Results (from the past 24 hour(s)) RESPIRATORY PANEL,PCR,NASOPHARYNGEAL Collection Time: 06/22/20  6:15 PM  
Result Value Ref Range Adenovirus Not detected NOTD Coronavirus 229E Not detected NOTD Coronavirus HKU1 Not detected NOTD Coronavirus CVNL63 Not detected NOTD Coronavirus OC43 Not detected NOTD Metapneumovirus Not detected NOTD Rhinovirus and Enterovirus Not detected NOTD Influenza A Not detected NOTD Influenza A, subtype H1 Not detected NOTD Influenza A, subtype H3 Not detected NOTD INFLUENZA A H1N1 PCR Not detected NOTD Influenza B Not detected NOTD Parainfluenza 1 Not detected NOTD Parainfluenza 2 Not detected NOTD Parainfluenza 3 Not detected NOTD Parainfluenza virus 4 Not detected NOTD    
 RSV by PCR Not detected NOTD B. parapertussis, PCR Not detected NOTD Bordetella pertussis - PCR Not detected NOTD Chlamydophila pneumoniae DNA, QL, PCR Not detected NOTD Mycoplasma pneumoniae DNA, QL, PCR Not detected NOTD    
SARS-COV-2 Collection Time: 06/22/20  6:15 PM  
Result Value Ref Range Specimen source Nasopharyngeal    
 SARS-CoV-2 PENDING Specimen source Nasopharyngeal    
CULTURE, BLOOD, PAIRED Collection Time: 06/22/20  6:18 PM  
Result Value Ref Range Special Requests: NO SPECIAL REQUESTS Culture result: NO GROWTH AFTER 10 HOURS    
LIPID PANEL Collection Time: 06/23/20  2:08 AM  
Result Value Ref Range LIPID PROFILE Cholesterol, total 150 <200 MG/DL Triglyceride 80 <150 MG/DL  
 HDL Cholesterol 67 MG/DL  
 LDL, calculated 67 0 - 100 MG/DL VLDL, calculated 16 MG/DL  
 CHOL/HDL Ratio 2.2 0.0 - 5.0 CK Collection Time: 06/23/20  2:08 AM  
Result Value Ref Range CK 6,021 (H) 39 - 308 U/L  
D DIMER Collection Time: 06/23/20  2:08 AM  
Result Value Ref Range D-dimer 3.83 (H) 0.00 - 0.65 mg/L FEU FIBRINOGEN Collection Time: 06/23/20  2:08 AM  
Result Value Ref Range Fibrinogen 658 (H) 200 - 475 mg/dL PROTHROMBIN TIME + INR Collection Time: 06/23/20  2:08 AM  
Result Value Ref Range INR 1.0 0.9 - 1.1 Prothrombin time 10.4 9.0 - 11.1 sec PTT Collection Time: 06/23/20  2:08 AM  
Result Value Ref Range aPTT 28.2 22.1 - 32.0 sec  
 aPTT, therapeutic range     58.0 - 55.2 SECS  
METABOLIC PANEL, BASIC Collection Time: 06/23/20  2:08 AM  
Result Value Ref Range Sodium 142 136 - 145 mmol/L Potassium 3.9 3.5 - 5.1 mmol/L Chloride 112 (H) 97 - 108 mmol/L  
 CO2 20 (L) 21 - 32 mmol/L Anion gap 10 5 - 15 mmol/L Glucose 88 65 - 100 mg/dL BUN 64 (H) 6 - 20 MG/DL  Creatinine 1.47 (H) 0.70 - 1.30 MG/DL  
 BUN/Creatinine ratio 44 (H) 12 - 20    
 GFR est AA 54 (L) >60 ml/min/1.73m2 GFR est non-AA 44 (L) >60 ml/min/1.73m2 Calcium 9.4 8.5 - 10.1 MG/DL  
CBC W/O DIFF Collection Time: 06/23/20  4:40 AM  
Result Value Ref Range WBC 14.4 (H) 4.1 - 11.1 K/uL  
 RBC 4.06 (L) 4.10 - 5.70 M/uL  
 HGB 12.8 12.1 - 17.0 g/dL HCT 39.1 36.6 - 50.3 % MCV 96.3 80.0 - 99.0 FL  
 MCH 31.5 26.0 - 34.0 PG  
 MCHC 32.7 30.0 - 36.5 g/dL  
 RDW 14.6 (H) 11.5 - 14.5 % PLATELET 537 439 - 301 K/uL MPV 10.8 8.9 - 12.9 FL  
 NRBC 0.0 0  WBC ABSOLUTE NRBC 0.00 0.00 - 0.01 K/uL Recent Labs  
  06/23/20 
0440 06/22/20 
1404 WBC 14.4* 16.2* HGB 12.8 13.6 HCT 39.1 41.4  268 Recent Labs  
  06/23/20 
0208 06/22/20 
1404  140  
K 3.9 4.2 * 110* CO2 20* 21 BUN 64* 61* CREA 1.47* 1.62* GLU 88 126* CA 9.4 10.2* Recent Labs  
  06/22/20 
1404 ALT 49  
* TBILI 0.8 TP 7.8 ALB 3.6 GLOB 4.2* Recent Labs  
  06/23/20 
0208 INR 1.0 PTP 10.4 APTT 28.2 Recent Labs  
  06/22/20 
1404 FERR 559* No results found for: FOL, RBCF No results for input(s): PH, PCO2, PO2 in the last 72 hours. Recent Labs  
  06/23/20 
0208 06/22/20 
1404 CPK 6,021* 6,935* TROIQ  --  0.06* Lab Results Component Value Date/Time Cholesterol, total 150 06/23/2020 02:08 AM  
 HDL Cholesterol 67 06/23/2020 02:08 AM  
 LDL, calculated 67 06/23/2020 02:08 AM  
 Triglyceride 80 06/23/2020 02:08 AM  
 CHOL/HDL Ratio 2.2 06/23/2020 02:08 AM  
 
No results found for: Noe Almanzar Lab Results Component Value Date/Time  Color YELLOW/STRAW 06/22/2020 02:04 PM  
 Appearance CLOUDY (A) 06/22/2020 02:04 PM  
 Specific gravity 1.021 06/22/2020 02:04 PM  
 pH (UA) 5.0 06/22/2020 02:04 PM  
 Protein 30 (A) 06/22/2020 02:04 PM  
 Glucose Negative 06/22/2020 02:04 PM  
 Ketone Negative 06/22/2020 02:04 PM  
 Bilirubin Negative 06/22/2020 02:04 PM  
 Urobilinogen 0.2 06/22/2020 02:04 PM  
 Nitrites Negative 06/22/2020 02:04 PM  
 Leukocyte Esterase Negative 06/22/2020 02:04 PM  
 Epithelial cells FEW 06/22/2020 02:04 PM  
 Bacteria 1+ (A) 06/22/2020 02:04 PM  
 WBC 0-4 06/22/2020 02:04 PM  
 RBC 0-5 06/22/2020 02:04 PM  
 
 
Med list reviewed Current Facility-Administered Medications Medication Dose Route Frequency  ondansetron (ZOFRAN) injection 4 mg  4 mg IntraVENous Q6H PRN  
 naloxone (NARCAN) injection 0.4 mg  0.4 mg IntraVENous PRN  
 0.9% sodium chloride infusion  75 mL/hr IntraVENous CONTINUOUS  
 levETIRAcetam (KEPPRA) 500 mg in 0.9% sodium chloride 100 mL IVPB  500 mg IntraVENous Q12H  
 acetaminophen (TYLENOL) tablet 650 mg  650 mg Oral Q6H PRN  
 hydrALAZINE (APRESOLINE) 20 mg/mL injection 10 mg  10 mg IntraVENous Q6H PRN  
 cefTRIAXone (ROCEPHIN) 1 g in 0.9% sodium chloride (MBP/ADV) 50 mL  1 g IntraVENous Q24H  
 famotidine (PF) (PEPCID) 20 mg in 0.9% sodium chloride 10 mL injection  20 mg IntraVENous Q24H Care Plan discussed with:  Patient/Family and Nurse Maryse Camarena MD 
Internal Medicine Date of Service: 6/23/2020

## 2020-06-24 NOTE — ROUTINE PROCESS
Patients daughter, Nia Christine, called to check on patient. Verified the patients son phone numbers are correct. Patients son did not have service this AM and missed MD calls. Informed patients family to call palliative as soon as possible and provided the number.

## 2020-06-24 NOTE — PROGRESS NOTES
Problem: Falls - Risk of 
Goal: *Absence of Falls Description: Document Wm Jurado Fall Risk and appropriate interventions in the flowsheet. Outcome: Progressing Towards Goal 
Note: Fall Risk Interventions: 
Mobility Interventions: Assess mobility with egress test 
 
Mentation Interventions: Adequate sleep, hydration, pain control, More frequent rounding Medication Interventions: Bed/chair exit alarm, Teach patient to arise slowly Elimination Interventions: Bed/chair exit alarm, Call light in reach, Stay With Me (per policy) History of Falls Interventions: Bed/chair exit alarm, Door open when patient unattended, Room close to nurse's station Problem: Patient Education: Go to Patient Education Activity Goal: Patient/Family Education Outcome: Progressing Towards Goal

## 2020-06-24 NOTE — PROGRESS NOTES
SLP Contact Note Consult received and appreciated. Chart reviewed. Palliative medicine involved, and they favor more of a comfort/hospice route. Given this and his advance age, would recommend providing PO only as patient requests and awaiting family decision. Will follow peripherally for final decision. Thank you, Darleen Membreno M.Ed, CCC-SLP Speech-Language Pathologist

## 2020-06-24 NOTE — PROGRESS NOTES
Problem: Mobility Impaired (Adult and Pediatric) Goal: *Acute Goals and Plan of Care (Insert Text) Description: FUNCTIONAL STATUS PRIOR TO ADMISSION: baseline level of functioning unclear due to current level of confusion. HOME SUPPORT PRIOR TO ADMISSION: Home situation unclear. Pt unable to report Physical Therapy Goals Initiated 6/24/2020 1. Patient will move from supine to sit and sit to supine  in bed with supervision/set-up within 7 day(s). 2.  Patient will transfer from bed to chair and chair to bed with supervision/set-up using the least restrictive device within 7 day(s). 3.  Patient will perform sit to stand with supervision/set-up within 7 day(s). 4.  Patient will ambulate with supervision/set-up for 150 feet with the least restrictive device within 7 day(s). Outcome: Progressing Towards Goal 
  
PHYSICAL THERAPY EVALUATION Patient: Fransico Larry (70 y.o. male) Date: 6/24/2020 Primary Diagnosis[facility] [S06.5X9A] Precautions: Fall ASSESSMENT Based on the objective data described below, the patient presents with confusion, decreased activity tolerance, generalized weakness, poor command following and decreased safety awareness. History and prior level of function are unclear given patients current mental status. He was unable to provide any history, only able to report his first name when asked. He was able to follow simple one step commands ~25% of the time and once at the edge of the bed was able to transfer and ambulate with RW with Min A x 2 overall. Per chart, pt seems to have been living alone prior to current admission. At this time, pt is not to be alone and will likely require SNF vs long term care placement once medically stable. Acute PT will continue to follow pt while he remains hospitalized to address deficits and improve function Current Level of Function Impacting Discharge (mobility/balance): Min A x 2 for bed mobility, transfers and ambulation with RW 
 
Functional Outcome Measure: The patient scored 15/100 on the Barthel Index outcome measure Other factors to consider for discharge: Dispo and prior level of function needs clarified by family Patient will benefit from skilled therapy intervention to address the above noted impairments. PLAN : 
Recommendations and Planned Interventions: bed mobility training, transfer training, gait training, edema management/control, patient and family training/education, and therapeutic activities Frequency/Duration: Patient will be followed by physical therapy:  5 times a week to address goals. Recommendation for discharge: (in order for the patient to meet his/her long term goals) To be determined: SNF vs long term care This discharge recommendation: A follow-up discussion with the attending provider and/or case management is planned IF patient discharges home will need the following DME: to be determined (TBD) SUBJECTIVE:  
Patient stated Femi. OBJECTIVE DATA SUMMARY:  
HISTORY:   
Past Medical History:  
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  
Procedure Laterality Date HX OTHER SURGICAL Tumor resection from left breast and bladder Personal factors and/or comorbidities impacting plan of care:  
 
Home Situation Home Environment: Private residence EXAMINATION/PRESENTATION/DECISION MAKING:  
Critical Behavior: 
Neurologic State: Alert, Confused Orientation Level: Disoriented to time, Disoriented to situation, Disoriented to place, Oriented to person Cognition: Decreased command following, Decreased attention/concentration, Poor safety awareness Safety/Judgement: Decreased awareness of environment, Decreased awareness of need for safety, Decreased insight into deficits Hearing: Auditory Auditory Impairment: Hard of hearing, bilateral 
Skin:  Defer to RN notes Edema: Defer to RN notes Range Of Motion: 
AROM: Generally decreased, functional 
  
  
  
  
  
  
  
Strength:   
Strength: Generally decreased, functional 
  
  
  
  
  
  
Tone & Sensation:  
Tone: Normal 
  
  
  
  
Sensation: Impaired Coordination: 
Coordination: Generally decreased, functional 
Vision:  
Tracking: Requires cues, head turns, or add eye shifts to track(difficulty to truly assess 2/2 mentation) Functional Mobility: 
Bed Mobility: 
  
Supine to Sit: Minimum assistance;Assist x2; Additional time Sit to Supine: Minimum assistance;Assist x2; Additional time Scooting: Minimum assistance;Assist x1 Transfers: 
Sit to Stand: Minimum assistance;Assist x2 Stand to Sit: Minimum assistance;Assist x1 Balance:  
Sitting: Impaired; With support Sitting - Static: Fair (occasional) Sitting - Dynamic: Fair (occasional) Standing: Impaired;Pull to stand Standing - Static: Constant support; Fair 
Standing - Dynamic : Constant support; Rubio Gonzalez Ambulation/Gait Training: 
Distance (ft): 40 Feet (ft) Assistive Device: Walker, rolling;Gait belt Ambulation - Level of Assistance: Minimal assistance;Assist x2 Gait Description (WDL): Exceptions to [facility] Base of Support: Narrowed Speed/Stephie: Pace decreased (<100 feet/min); Shuffled Step Length: Left shortened;Right shortened Functional Measure: 
Barthel Index: 
 
Bathin Bladder: 0 Bowels: 5 Groomin Dressin Feedin Mobility: 5 Stairs: 0 Toilet Use: 0 Transfer (Bed to Chair and Back): 5 Total: 15/100 The Barthel ADL Index: Guidelines 1. The index should be used as a record of what a patient does, not as a record of what a patient could do. 2. The main aim is to establish degree of independence from any help, physical or verbal, however minor and for whatever reason. 3. The need for supervision renders the patient not independent. 4. A patient's performance should be established using the best available evidence. Asking the patient, friends/relatives and nurses are the usual sources, but direct observation and common sense are also important. However direct testing is not needed. 5. Usually the patient's performance over the preceding 24-48 hours is important, but occasionally longer periods will be relevant. 6. Middle categories imply that the patient supplies over 50 per cent of the effort. 7. Use of aids to be independent is allowed. Lyndsay Farias, Barthel, D.W. (7236). Functional evaluation: the Barthel Index. [facility] (14)2. Carola Viveros pedro FLAQUITO WeathersF, Pat Quiroz., Neymar Guerrero., [facility], [facility] [facility] (1999). Measuring the change indisability after inpatient rehabilitation; comparison of the responsiveness of the Barthel Index and Functional [place] Measure. Journal of Neurology, Neurosurgery, and Psychiatry, 66(4), 410-433. MIRANDA Dunn, ROSIE Alejandro, & Danielito Coronado M.A. (2004.) Assessment of post-stroke quality of life in cost-effectiveness studies: The usefulness of the Barthel Index and the EuroQoL-5D. [facility], 13, [phone] Physical Therapy Evaluation Charge Determination History Examination Presentation Decision-Making MEDIUM  Complexity : 1-2 comorbidities / personal factors will impact the outcome/ POC  LOW Complexity : 1-2 Standardized tests and measures addressing body structure, function, activity limitation and / or participation in recreation  MEDIUM Complexity : Evolving with changing characteristics  LOW Complexity : FOTO score of  Based on the above components, the patient evaluation is determined to be of the following complexity level: LOW Pain Rating: 
Denied pain Activity Tolerance:  
Fair Please refer to the flowsheet for vital signs taken during this treatment. After treatment patient left in no apparent distress:  
Supine in bed and Call bell within reach COMMUNICATION/EDUCATION:  
The patients plan of care was discussed with: Occupational therapist.  
 
Fall prevention education was provided and the patient/caregiver indicated understanding., Patient/family have participated as able in goal setting and plan of care. , and Patient/family agree to work toward stated goals and plan of care. Thank you for this referral. 
Vi eBgum PT, DPT Time Calculation: 30 mins

## 2020-06-24 NOTE — PROGRESS NOTES
Transition of Care Plan:   
-RUR:  7 %                    
-Patient is from home 
-Tentative plan for hospice, awaiting discussion with son 
-Palliative following -Left message with son Julianna Baeza [phone], awaiting a return call CM will follow SABA Hackett/CRM

## 2020-06-24 NOTE — PROGRESS NOTES
Problem: Self Care Deficits Care Plan (Adult) Goal: *Acute Goals and Plan of Care (Insert Text) Description:  
FUNCTIONAL STATUS PRIOR TO ADMISSION: Pt unable to provide social history due to mentation, however per chart review, Patient lives in his own home with in law suite adjoined that has tenant/friend who checks on patient and also family nearby -- Son (lives in [facility]) and checks on his dad frequently. Patient was still driving short distances (family has now taken keys and told him he can't drive any more) HOME SUPPORT: The patient lived alone with son in area to provide limited assistance. Occupational Therapy Goals Initiated 6/24/2020 1. Patient will perform self-feeding with minimal assistance/contact guard assist within 7 day(s). 2.  Patient will perform grooming with moderate assistance  within 7 day(s). 3.  Patient will perform LB dressing with moderate assistance  within 7 day(s). 4.  Patient will perform toilet transfers with minimal assistance/contact guard assist within 7 day(s). 5.  Patient will perform all aspects of toileting with moderate assistance  within 7 day(s). Outcome: Progressing Towards Goal 
 
OCCUPATIONAL THERAPY EVALUATION Patient: Arabella Casarez (60 y.o. male) Date: 6/24/2020 Primary Diagnosis: [facility] [S06.5X9A] Precautions: fall, NPO at time of evaluation ASSESSMENT Based on the objective data described below, the patient presents with confusion, impaired ADL related mobility, weakness, decreased activity tolerance, impaired mentation s/p admission for SDH s/p fall at home. Pt participated in evaluation with frequent cuing (tactile, verbal). Pt demonstrated confusion throughout (disoriented to self, location), and was perseverative at times, verbalizing the same word over and over in reference to the same idea/item or novel idea item. Pleasant, non-combative or agitated.  Tolerated ADL related mobility with min a x 2 and UB/LB dressing with mod to max A. Of note, pt has palliative consult placed. Pt will benefit from OT in this session to provide continued assessment/training for ADLs as well as family training to assist with safe d/c to home/community. Will continue to follow and recommend SNF vs. LTC pending palliative POC and pts progress during hospital stay. Current Level of Function Impacting Discharge (ADLs/self-care): up to MAX A for ADLs Functional Outcome Measure: The patient scored Total: 10/100 on the Barthel Index outcome measure Other factors to consider for discharge: safety concern for living alone at this time, palliative consult involved in plan of care Patient will benefit from skilled therapy intervention to address the above noted impairments. PLAN : 
Recommendations and Planned Interventions: self care training, therapeutic exercise, balance training, visual/perceptual training, therapeutic activities, patient education, home safety training, and family training/education Frequency/Duration: Patient will be followed by occupational therapy 2 times a week to address goals. Recommendation for discharge: (in order for the patient to meet his/her long term goals) Therapy up to 5 days/week in SNF setting vs long term care - of note, palliative care involved in pts POC This discharge recommendation: 
Has not yet been discussed the attending provider and/or case management IF patient discharges home will need the following DME: TBD SUBJECTIVE:  
Patient perseverative throughout session. When asked his name, he was only able to provide his first name for both first/last name. OBJECTIVE DATA SUMMARY:  
HISTORY:  
Past Medical History:  
Diagnosis Date  Altered awareness, transient  Cancer ([facility] Left breast Ca, Bladder Ca Past Surgical History:  
Procedure Laterality Date  HX OTHER SURGICAL Tumor resection from left breast and bladder Expanded or extensive additional review of patient history:  
 
Home Situation Home Environment: Independent living Hand dominance: Right EXAMINATION OF PERFORMANCE DEFICITS: 
Cognitive/Behavioral Status: 
Neurologic State: Alert;Confused Orientation Level: Disoriented to time;Disoriented to situation;Disoriented to place; Disoriented to person(able to state first name only) Cognition: Decreased command following;Decreased attention/concentration;Poor safety awareness;Memory loss; Impaired decision making Perception: Cues to attend left visual field Perseveration: Perseverates during conversation(requesting water (currently NPO)) Safety/Judgement: Decreased awareness of environment;Decreased awareness of need for safety;Decreased insight into deficits Skin: of note - toenails and finger nails of pt are very long Edema: none observed Hearing: Auditory Auditory Impairment: Hard of hearing, bilateral 
 
Vision/Perceptual:   
Tracking: Requires cues, head turns, or add eye shifts to track(difficulty to truly assess 2/2 mentation) Of note - when pt asked to read clock, incorrectly Ided 200pm as 1200. Verbalized picture on wall of trees was \"horse\" as well as trees from window were \"horse\" - question visual impairments vs. wordfinding Range of Motion: 
 
AROM: Generally decreased, functional 
  
  
  
  
  
  
  
 
Strength: 
 
Strength: Generally decreased, functional 
  
  
  
  
 
Coordination: 
Coordination: Generally decreased, functional 
Fine Motor Skills-Upper: Left Intact; Right Intact Gross Motor Skills-Upper: Right Intact; Left Intact Tone & Sensation: 
 
Tone: Normal 
Sensation: Impaired Balance: 
Sitting: With support; Impaired Sitting - Static: Fair (occasional) Sitting - Dynamic: Fair (occasional) Standing: Impaired;Pull to stand Standing - Static: Constant support; Fair 
Standing - Dynamic : Constant support; Rubio Gonzalez Functional Mobility and Transfers for ADLs: 
Bed Mobility: 
Supine to Sit: Minimum assistance;Assist x2 Sit to Supine: Minimum assistance;Assist x2 Scooting: Minimum assistance;Assist x1 Transfers: 
Sit to Stand: Minimum assistance;Assist x2 Stand to Sit: Minimum assistance;Assist x1 ADL Assessment: 
Feeding: Moderate assistance Oral Facial Hygiene/Grooming: Moderate assistance Bathing: Maximum assistance Upper Body Dressing: Moderate assistance Lower Body Dressing: Maximum assistance Toileting: Moderate assistance ADL Intervention and task modifications: 
  
 
  
 
  
 
  
 
Upper Body Dressing Assistance Dressing Assistance: Moderate assistance Hospital Gown: Moderate assistance(cuing to perform) Lower Body Dressing Assistance Dressing Assistance: Maximum assistance;Supervision;Set-up Socks: Maximum assistance;Set-up; Supervision(able to initiate portions of tasks, however max A to complet) Cognitive Retraining Safety/Judgement: Decreased awareness of environment;Decreased awareness of need for safety;Decreased insight into deficits Functional Measure: 
Barthel Index: 
 
Bathin Bladder: 0 Bowels: 5 Groomin Dressin(2/2 cognition) Feedin Mobility: 0 Stairs: 0 Toilet Use: 0 Transfer (Bed to Chair and Back): 5 Total: 10/100 The Barthel ADL Index: Guidelines 1. The index should be used as a record of what a patient does, not as a record of what a patient could do. 2. The main aim is to establish degree of independence from any help, physical or verbal, however minor and for whatever reason. 3. The need for supervision renders the patient not independent. 4. A patient's performance should be established using the best available evidence. Asking the patient, friends/relatives and nurses are the usual sources, but direct observation and common sense are also important. However direct testing is not needed. 5. Usually the patient's performance over the preceding 24-48 hours is important, but occasionally longer periods will be relevant. 6. Middle categories imply that the patient supplies over 50 per cent of the effort. 7. Use of aids to be independent is allowed. Donald Morales., Barthel, D.W. (2148). Functional evaluation: the Barthel Index. [address] [facility] (14)2. Mykel Teresa pedro PIERO Weathers, Jayme Mari., Marcia Wilson., Mouna Litter, [facility] (1999). Measuring the change indisability after inpatient rehabilitation; comparison of the responsiveness of the Barthel Index and Functional Leslie Measure. Journal of Neurology, Neurosurgery, and Psychiatry, 66(4), 485-892. LEXI StaleyA, ROSIE Alejandro, & Yamile Casas M.A. (2004.) Assessment of post-stroke quality of life in cost-effectiveness studies: The usefulness of the Barthel Index and the EuroQoL-5D. [facility], 27, 277-17 Occupational Therapy Evaluation Charge Determination History Examination Decision-Making LOW Complexity : Brief history review  MEDIUM Complexity : 3-5 performance deficits relating to physical, cognitive , or psychosocial skils that result in activity limitations and / or participation restrictions MEDIUM Complexity : Patient may present with comorbidities that affect occupational performnce. Miniml to moderate modification of tasks or assistance (eg, physical or verbal ) with assesment(s) is necessary to enable patient to complete evaluation Based on the above components, the patient evaluation is determined to be of the following complexity level: LOW Pain Rating: Not rated/reported Activity Tolerance:  
Fair Please refer to the flowsheet for vital signs taken during this treatment.  
 
After treatment patient left in no apparent distress:   
Supine in bed, Call bell within reach, and Side rails x 3 
 
COMMUNICATION/EDUCATION:  
 The patients plan of care was discussed with: Physical therapist and Registered nurse. Patient with limited ability to participate in goal setting and plan of care. This patients plan of care is appropriate for delegation to [facility]. Thank you for this referral. 
Ghulam Edgar OT Time Calculation: 26 mins

## 2020-06-24 NOTE — PROGRESS NOTES
Bedside shift change report given to Shaista Romeo (oncoming nurse) by Henrique Cunha RN (offgoing nurse). Report included the following information SBAR, Kardex, Intake/Output, MAR and Recent Results.

## 2020-06-24 NOTE — PROGRESS NOTES
First remote telemetry box number 15 was not functioning. RN changed leads and stickers and remote telemetry box number 15 still not working. RN discussed with [employer]. Patient placed on remote telemetry box number 21.

## 2020-06-24 NOTE — ACP (ADVANCE CARE PLANNING)
[address] [facility] Adult  Hospitalist Group Advance Care Planning Note Name: Shaneka Chen YOB: 1923 MRN: [id number] Admission Date: 6/22/2020 12:01 PM 
 
Date of discussion: 6/24/2020 Active Diagnoses: 
 
Hospital Problems  Date Reviewed: 4/25/2019 Codes Class Noted POA  
 [facility] ICD-10-CM: Q26.3R7V 
ICD-9-CM: 432.1  6/22/2020 Unknown These active diagnoses are of sufficient risk that focused discussion on advance care planning is indicated in order to allow the patient to thoughtfully consider personal goals of care, and if situations arise that prevent the ability to personally give input, to ensure appropriate representation of their personal desires for different levels and aggressiveness of care. Discussion:  
 
Persons present and participating in discussion:  Radhika Salazar MD, son Shaneka Chen Discussion: Explained regarding patient's status - acute on chronic SDH and fall and rhabdomyolysis. He understand his father is at his last stage. He states that is father has DNR paper work. He and his sister were already arranging for hospice care at home. He agreed for hospice consult in the hospital. Comfort measures initiated. Time Spent:  
 
Total time spent face-to-face in education and discussion: 16 minutes.   
 
Radhika Salazar MD 
Date of Service:  6/24/2020 
6:15 PM

## 2020-06-24 NOTE — PROGRESS NOTES
Palliative Medicine Consult Aaron: [phone]) Patient Name: Trinh Foss YOB: 1923 Date of Initial Consult: 6/23/2020 Reason for Consult: care decisions Requesting Provider: Dr. Ely Ellis Primary Care Physician: Chanda Veloz MD 
 
 SUMMARY:  
Trinh Foss is a 80 y.o. with a past history of early dementia still independent in ADLs, living alone, resection of bladder tumor 2009, BPH, who was admitted on 6/22/2020 after found by EMS  with a diagnosis of AMS . Current medical issues leading to Palliative Medicine involvement include: Acute on chronic subdural hematoma, patient unresponsive Patient lives in his own home with in law suite adjoined that has tenant/friend who checks on patient and also family nearby -- Son Omero Mccauley (lives in [place] [place]) and checks on his dad frequently. Patient was still driving short distances (family has now taken keys and told him he can't drive any more) Patient is a retired salesman for [employer]:  
1. Delirium, metabolic encephalopathy 2. Patient does not demonstrate decision making capacity at this time. 3. Early dementia 4. Tobacco use 5. Daily alcohol use (3 bourbons and water each night) PLAN:  
1. Patient is nonverbal and unable to provide any history, appears more alert and awake today. 2. Several calls made to his son Omero Mccauley III, left voicemail, awaiting callback. Discussed with RN, patient son did call this morning to get updates. RN now knows to let him know to call our office to reach me and get an alternative number for son to be reached. 3. Per review of palliative medicine consult in April 2019, patient has a D DNR at home per son but it was never brought to the hospital or scanned in. He does have an AMD in chart naming his son as his medical power of .  
4. We will wait for son to call back to clarify CODE STATUS and encourage comfort oriented care with the support of hospice at home. 5. Initial consult note routed to primary continuity provider and/or primary health care team members 6. Communicated plan of care with: Palliative IDTShashi 192 Team, primary RN 
 
 GOALS OF CARE / TREATMENT PREFERENCES:  
 
GOALS OF CARE: 
Patient/Health Care Proxy Stated Goals: Comfort TREATMENT PREFERENCES:  
Code Status: Full Code Advance Care Planning: 
[x] [facility] Interdisciplinary Team has updated the ACP Navigator with Aishwaryan and Patient Capacity Advance Care Planning 6/22/2020 Patient's Healthcare Decision Maker is: Named in scanned ACP document Confirm Advance Directive Yes, on file Medical Interventions: Comfort measures Other Instructions: Other: As far as possible, the palliative care team has discussed with patient / health care proxy about goals of care / treatment preferences for patient. HISTORY:  
 
History obtained from: chart, son CHIEF COMPLAINT: found altered HPI/SUBJECTIVE: The patient is:  
[] Verbal and participatory [x] Non-participatory due to:  
  
Patient laying in bed comfortably, unable to provide any history. Clinical Pain Assessment (nonverbal scale for severity on nonverbal patients):  
Clinical Pain Assessment Severity: 0 Activity (Movement): Lying quietly, normal position Duration: for how long has pt been experiencing pain (e.g., 2 days, 1 month, years) Frequency: how often pain is an issue (e.g., several times per day, once every few days, constant) FUNCTIONAL ASSESSMENT:  
 
Palliative Performance Scale (PPS): PPS: 40 PSYCHOSOCIAL/SPIRITUAL SCREENING:  
 
Palliative IDT has assessed this patient for cultural preferences / practices and a referral made as appropriate to needs (Cultural Services, Patient Advocacy, Ethics, etc.) Any spiritual / Adventist concerns: 
[] Yes /  [x] No 
 
Caregiver Burnout: 
 [] Yes /  [x] No /  [] No Caregiver Present Anticipatory grief assessment:  
[x] Normal  / [] Maladaptive ESAS Anxiety: ESAS Depression:    
 
 
 REVIEW OF SYSTEMS:  
 
Positive and pertinent negative findings in ROS are noted above in HPI. The following systems were [x] reviewed / [] unable to be reviewed as noted in HPI Other findings are noted below. Systems: constitutional, ears/nose/mouth/throat, respiratory, gastrointestinal, genitourinary, musculoskeletal, integumentary, neurologic, psychiatric, endocrine. Positive findings noted below. Modified ESAS Completed by: provider Pain: 0 Dyspnea: 0 PHYSICAL EXAM:  
 
From RN flowsheet: 
Wt Readings from Last 3 Encounters:  
06/23/20 126 lb (57.2 kg) 04/25/19 138 lb 14.2 oz (63 kg) Blood pressure 120/67, pulse 87, temperature 98.8 °F (37.1 °C), resp. rate 18, height 5' 11\" (1.803 m), weight 126 lb (57.2 kg), SpO2 92 %. Pain Scale 1: Visual 
Pain Intensity 1: 0 Last bowel movement, if known:  
 
Constitutional: Sleeping  
eyes: n/a 
ENMT: no nasal discharge, moist mucous membranes Cardiovascular: regular rhythm, distal pulses intact Respiratory: breathing not labored, symmetric Gastrointestinal: soft non-tender, +bowel sounds Musculoskeletal: Chronic disuse atrophy  
skin: warm, dry Neurologic: Unconscious HISTORY:  
 
Active Problems: 
  SDH (subdural hematoma) ([facility] (6/22/2020) Past Medical History:  
Diagnosis Date  Altered awareness, transient  Cancer ([address] Left breast Ca, Bladder Ca Past Surgical History:  
Procedure Laterality Date  HX OTHER SURGICAL Tumor resection from left breast and bladder History reviewed. No pertinent family history. History reviewed, no pertinent family history. Social History Tobacco Use  Smoking status: Current Every Day Smoker  Smokeless tobacco: Never Used  Tobacco comment: Smokes a pipe Substance Use Topics  Alcohol use: Yes Alcohol/week: 14.0 standard drinks Types: 14 Shots of liquor per week No Known Allergies Current Facility-Administered Medications Medication Dose Route Frequency  ondansetron (ZOFRAN) injection 4 mg  4 mg IntraVENous Q6H PRN  
 naloxone (NARCAN) injection 0.4 mg  0.4 mg IntraVENous PRN  
 0.9% sodium chloride infusion  75 mL/hr IntraVENous CONTINUOUS  
 levETIRAcetam (KEPPRA) 500 mg in 0.9% sodium chloride 100 mL IVPB  500 mg IntraVENous Q12H  
 acetaminophen (TYLENOL) tablet 650 mg  650 mg Oral Q6H PRN  
 hydrALAZINE (APRESOLINE) 20 mg/mL injection 10 mg  10 mg IntraVENous Q6H PRN  
 famotidine (PF) (PEPCID) 20 mg in 0.9% sodium chloride 10 mL injection  20 mg IntraVENous Q24H  
 
 
 
 LAB AND IMAGING FINDINGS:  
 
Lab Results Component Value Date/Time WBC 12.5 (H) 06/24/2020 05:16 AM  
 HGB 12.8 06/24/2020 05:16 AM  
 PLATELET 827 09/95/9144 05:16 AM  
 
Lab Results Component Value Date/Time Sodium 143 06/24/2020 05:16 AM  
 Potassium 3.8 06/24/2020 05:16 AM  
 Chloride 116 (H) 06/24/2020 05:16 AM  
 CO2 19 (L) 06/24/2020 05:16 AM  
 BUN 52 (H) 06/24/2020 05:16 AM  
 Creatinine 1.25 06/24/2020 05:16 AM  
 Calcium 8.8 06/24/2020 05:16 AM  
 Magnesium 2.4 04/24/2019 04:32 AM  
 Phosphorus 3.4 04/24/2019 04:32 AM  
  
Lab Results Component Value Date/Time Alk. phosphatase 104 06/24/2020 05:16 AM  
 Protein, total 6.9 06/24/2020 05:16 AM  
 Albumin 2.9 (L) 06/24/2020 05:16 AM  
 Globulin 4.0 06/24/2020 05:16 AM  
 
Lab Results Component Value Date/Time INR 1.0 06/24/2020 05:16 AM  
 Prothrombin time 10.5 06/24/2020 05:16 AM  
 aPTT 28.6 06/24/2020 05:16 AM  
  
Lab Results Component Value Date/Time Ferritin 559 (H) 06/22/2020 02:04 PM  
  
No results found for: PH, PCO2, PO2 No components found for: [place] Lab Results Component Value Date/Time  CK 2,057 (H) 06/24/2020 05:16 AM  
 CK - MB 6.9 (H) 11/16/2009 03:35 AM  
  
 Total time: 50 minutes Counseling / coordination time, spent as noted above: 50 minutes 
> 50% counseling / coordination?:  Yes Prolonged service was provided for  []30 min   []75 min in face to face time in the presence of the patient, spent as noted above. Time Start:  
Time End:  
Note: this can only be billed with 92557 (initial) or 41520 (follow up). If multiple start / stop times, list each separately.

## 2020-06-24 NOTE — PROGRESS NOTES
Hospitalist Progress Note Hospital summary: Joey Reyes is a 80 y.o. male who presents with fall 
  
Patient is extremely poor historian, I could not obtain much history from the patient. I tried calling patient's son Darcie Lobo on the telephone and left a voicemail and could not connect with him. History was primarily reviewed by chart review. Apparently patient had a fall yesterday and today. Patient lives all by himself. Was found on the floor today at approximately 8:30 AM.  Patient sure exactly what happened. Patient was not able able to bear weight and walk on his legs. Usually walks without assistance. Patient denies any other complaints or problems, although appears to be quite confused. Patient was brought to the hospital and was found to have a subdural hematoma. Patient was requested to be admitted under the hospitalist service. No further history could be obtained from the patient, no review of symptoms could be done due to his mental status. Per ER physician, the son reported to him that patient's mental status appeared to be  6/22/2020 Assessment/Plan: 
Acute on chronic Subdural hematoma S/p fall 
-  CT head: New, bilateral acute on chronic subdural hemorrhage. No herniation - Appreciate NSGY input 
- on Keppra for seizure ppx Acute metabolic encephalopathy - improved Underlying dementia - pt appears at his baseline today 
- supportive care Traumatic Rhabdomyolysis: improving 
- serial CK checks - dced IVF due to concern for fluid overload SANDRA - likely sec to rhabdo + obstructive uropathy - improving 
- CT: Moderate right hydroureteronephrosis. Mild distention of the distal left ureter. - urology on board 
- alberto placed - dced abx as urine cx negative - will monitor renal function Leukocytosis  - improving 
- could be due to dehydration and rhabdo -  COVID-19 test negative - blood cx: NGTD 
- resp pcr negative - CXR: No acute cardiopulmonary disease 
- will monitor Elevated LFTs:  
- could be due to rhabdo 
- acute hepatitis panel negative 
-   right upper quadrant ultrasound: No acute abnormality is identified 
- will monitor Palliative care on board Code status: DNR. Cj Garrett DVT prophylaxis: SCDs Disposition: TBD home hospice 
---------------------------------------------- 
 
CC: Fall S: Patient is seen and examined at bedside this morning. Alert, oriented to name, much more interactive -  appears at his baseline Tried to reach son, left voicemail Addendum: 
Able to reach son at 6pm. Updated patient's status and family is already in talks with hospice agency as outpatient. He agreed for hospice consult now. Comfort measures initiated. Review of Systems: 
Review of systems not obtained due to patient factors. O: 
Visit Vitals /68 Pulse 87 Temp 98 °F (36.7 °C) Resp 24 Ht 5' 11\" (1.803 m) Wt 57.2 kg (126 lb) SpO2 90% BMI 17.57 kg/m² PHYSICAL EXAM: 
Gen: NAD Neck: supple Heart: RRR, no MRG, no peripheral edema Lungs: CTA b/l, non-labored respirations Abd: soft, NT, ND, BS+ Extr: warm Skin: dry, no rash Neuro: alert and oriented x1 Intake/Output Summary (Last 24 hours) at 6/24/2020 1526 Last data filed at 6/24/2020 0800 Gross per 24 hour Intake 600 ml Output 825 ml Net -225 ml Recent labs & imaging reviewed: 
Recent Results (from the past 24 hour(s)) D DIMER Collection Time: 06/24/20  5:16 AM  
Result Value Ref Range D-dimer 3.46 (H) 0.00 - 0.65 mg/L FEU FIBRINOGEN Collection Time: 06/24/20  5:16 AM  
Result Value Ref Range Fibrinogen 673 (H) 200 - 475 mg/dL PROTHROMBIN TIME + INR Collection Time: 06/24/20  5:16 AM  
Result Value Ref Range INR 1.0 0.9 - 1.1 Prothrombin time 10.5 9.0 - 11.1 sec PTT Collection Time: 06/24/20  5:16 AM  
Result Value Ref Range aPTT 28.6 22.1 - 32.0 sec aPTT, therapeutic range     58.0 - 77.0 SECS  
HEPATITIS PANEL, ACUTE Collection Time: 06/24/20  5:16 AM  
Result Value Ref Range Hepatitis A, IgM NONREACTIVE NR    
 __ Hepatitis B surface Ag <0.10 Index Hep B surface Ag Interp. Negative NEG    
 __ Hepatitis B core, IgM NONREACTIVE NR    
 __ Hep C  virus Ab Interp. NONREACTIVE NR Hep C  virus Ab comment Method used is [place] [facility] CBC WITH AUTOMATED DIFF Collection Time: 06/24/20  5:16 AM  
Result Value Ref Range WBC 12.5 (H) 4.1 - 11.1 K/uL  
 RBC 4.05 (L) 4.10 - 5.70 M/uL  
 HGB 12.8 12.1 - 17.0 g/dL HCT 40.2 36.6 - 50.3 % MCV 99.3 (H) 80.0 - 99.0 FL  
 MCH 31.6 26.0 - 34.0 PG  
 MCHC 31.8 30.0 - 36.5 g/dL  
 RDW 15.0 (H) 11.5 - 14.5 % PLATELET 331 437 - 884 K/uL MPV 10.3 8.9 - 12.9 FL  
 NRBC 0.0 0  WBC ABSOLUTE NRBC 0.00 0.00 - 0.01 K/uL NEUTROPHILS 80 (H) 32 - 75 % LYMPHOCYTES 8 (L) 12 - 49 % MONOCYTES 11 5 - 13 % EOSINOPHILS 0 0 - 7 % BASOPHILS 0 0 - 1 % IMMATURE GRANULOCYTES 1 (H) 0.0 - 0.5 % ABS. NEUTROPHILS 10.0 (H) 1.8 - 8.0 K/UL  
 ABS. LYMPHOCYTES 1.0 0.8 - 3.5 K/UL  
 ABS. MONOCYTES 1.4 (H) 0.0 - 1.0 K/UL  
 ABS. EOSINOPHILS 0.0 0.0 - 0.4 K/UL  
 ABS. BASOPHILS 0.0 0.0 - 0.1 K/UL  
 ABS. IMM. GRANS. 0.1 (H) 0.00 - 0.04 K/UL  
 DF AUTOMATED METABOLIC PANEL, COMPREHENSIVE Collection Time: 06/24/20  5:16 AM  
Result Value Ref Range Sodium 143 136 - 145 mmol/L Potassium 3.8 3.5 - 5.1 mmol/L Chloride 116 (H) 97 - 108 mmol/L  
 CO2 19 (L) 21 - 32 mmol/L Anion gap 8 5 - 15 mmol/L Glucose 76 65 - 100 mg/dL BUN 52 (H) 6 - 20 MG/DL Creatinine 1.25 0.70 - 1.30 MG/DL  
 BUN/Creatinine ratio 42 (H) 12 - 20 GFR est AA >60 >60 ml/min/1.73m2 GFR est non-AA 53 (L) >60 ml/min/1.73m2 Calcium 8.8 8.5 - 10.1 MG/DL  Bilirubin, total 0.6 0.2 - 1.0 MG/DL  
 ALT (SGPT) 51 12 - 78 U/L  
 AST (SGOT) 131 (H) 15 - 37 U/L  
 Alk. phosphatase 104 45 - 117 U/L Protein, total 6.9 6.4 - 8.2 g/dL Albumin 2.9 (L) 3.5 - 5.0 g/dL Globulin 4.0 2.0 - 4.0 g/dL A-G Ratio 0.7 (L) 1.1 - 2.2 CK Collection Time: 06/24/20  5:16 AM  
Result Value Ref Range CK 2,057 (H) 39 - 308 U/L Recent Labs  
  06/24/20 
0516 06/23/20 
0440 WBC 12.5* 14.4* HGB 12.8 12.8 HCT 40.2 39.1  234 Recent Labs  
  06/24/20 
0516 06/23/20 
0208 06/22/20 
1404  142 140  
K 3.8 3.9 4.2 * 112* 110* CO2 19* 20* 21 BUN 52* 64* 61* CREA 1.25 1.47* 1.62* GLU 76 88 126* CA 8.8 9.4 10.2* Recent Labs  
  06/24/20 
0516 06/23/20 
0208 06/22/20 
1404 ALT 51 51 49  119* 127* TBILI 0.6 0.6 0.8 TP 6.9 6.6 7.8 ALB 2.9* 3.2* 3.6 GLOB 4.0 3.4 4.2* Recent Labs  
  06/24/20 
0516 06/23/20 
8227 INR 1.0 1.0 PTP 10.5 10.4 APTT 28.6 28.2 Recent Labs  
  06/22/20 
1404 FERR 559* No results found for: FOL, RBCF No results for input(s): PH, PCO2, PO2 in the last 72 hours. Recent Labs  
  06/24/20 
0516 06/23/20 
0208 06/22/20 
1404 CPK 2,057* 6,021* 6,935* TROIQ  --   --  0.06* Lab Results Component Value Date/Time Cholesterol, total 150 06/23/2020 02:08 AM  
 HDL Cholesterol 67 06/23/2020 02:08 AM  
 LDL, calculated 67 06/23/2020 02:08 AM  
 Triglyceride 80 06/23/2020 02:08 AM  
 CHOL/HDL Ratio 2.2 06/23/2020 02:08 AM  
 
No results found for: Brii Ruiz Lab Results Component Value Date/Time  Color YELLOW/STRAW 06/22/2020 02:04 PM  
 Appearance CLOUDY (A) 06/22/2020 02:04 PM  
 Specific gravity 1.021 06/22/2020 02:04 PM  
 pH (UA) 5.0 06/22/2020 02:04 PM  
 Protein 30 (A) 06/22/2020 02:04 PM  
 Glucose Negative 06/22/2020 02:04 PM  
 Ketone Negative 06/22/2020 02:04 PM  
 Bilirubin Negative 06/22/2020 02:04 PM  
 Urobilinogen 0.2 06/22/2020 02:04 PM  
 Nitrites Negative 06/22/2020 02:04 PM  
 Leukocyte Esterase Negative 06/22/2020 02:04 PM  
 Epithelial cells FEW 06/22/2020 02:04 PM  
 Bacteria 1+ (A) 06/22/2020 02:04 PM  
 WBC 0-4 06/22/2020 02:04 PM  
 RBC 0-5 06/22/2020 02:04 PM  
 
 
Med list reviewed Current Facility-Administered Medications Medication Dose Route Frequency  albuterol-ipratropium (DUO-NEB) 2.5 MG-0.5 MG/3 ML  3 mL Nebulization Q6H RT  
 ondansetron (ZOFRAN) injection 4 mg  4 mg IntraVENous Q6H PRN  
 naloxone (NARCAN) injection 0.4 mg  0.4 mg IntraVENous PRN  
 levETIRAcetam (KEPPRA) 500 mg in 0.9% sodium chloride 100 mL IVPB  500 mg IntraVENous Q12H  
 acetaminophen (TYLENOL) tablet 650 mg  650 mg Oral Q6H PRN  
 hydrALAZINE (APRESOLINE) 20 mg/mL injection 10 mg  10 mg IntraVENous Q6H PRN  
 famotidine (PF) (PEPCID) 20 mg in 0.9% sodium chloride 10 mL injection  20 mg IntraVENous Q24H Care Plan discussed with:  Patient/Family and Nurse Enrrique Magdaleno MD 
Internal Medicine Date of Service: 6/24/2020

## 2020-06-24 NOTE — PROGRESS NOTES
Primary Nurse Sarah Fischer RN and Marty Ness RN performed a dual skin assessment on this patient Impairment noted- see wound doc flow sheet Xavi score is 15 Abrasions to head, left knee; abrasions & sores on the toes of the left foot, and abrasions on the right calf. Bruising, redness, and slight swelling to left hand. Friction wound to sacral/coccyx.

## 2020-06-25 NOTE — PROGRESS NOTES
SLP Contact Note Patient and family actively pursuing hospice. Therefore, recommend allowing PO for comfort, swabs with patient's favorite flavors, however, do not force feed or force patient to drink, as pt's body is likely shutting down and he may not require food/drink. SLP will sign-off but is available to answer questions as needed. Thank you, Fredrick Leach M.Ed, CCC-SLP Speech-Language Pathologist

## 2020-06-25 NOTE — PROGRESS NOTES
Palliative Medicine Consult Walker: [phone] Patient Name: Elmo Alfred YOB: 1923 Date of Initial Consult: 6/23/2020 Reason for Consult: care decisions Requesting Provider: Dr. Aron Schmidt Primary Care Physician: Dulce Villa MD 
 
 SUMMARY:  
Elmo Alfred is a 80 y.o. with a past history of early dementia still independent in ADLs, living alone, resection of bladder tumor 2009, BPH, who was admitted on 6/22/2020 after found by EMS  with a diagnosis of AMS . Current medical issues leading to Palliative Medicine involvement include: Acute on chronic subdural hematoma, patient unresponsive Patient lives in his own home with in law suite adjoined that has tenant/friend who checks on patient and also family nearby -- Son Isidro Mckeon (lives in [facility]) and checks on his dad frequently. Patient was still driving short distances (family has now taken keys and told him he can't drive any more) Patient is a retired salesman for [employer]:  
1. Delirium, metabolic encephalopathy 2. Patient does not demonstrate decision making capacity at this time. 3. Early dementia 4. Tobacco use 5. Daily alcohol use (3 bourbons and water each night) PLAN:  
1. Patient is nonverbal and unable to provide any history, appears more alert and awake today. 2. Long phone conversation with patient's daughter Frankey Railing who lives in [facility]. Hospitalist from yesterday also made contact with the son and [address] has been changed to DNR along with plan for home with hospice. -Explained hospice care with the daughter who is very disappointed to hear that hospice will not be providing 24-hour bedside care in her father's home. She was under the impression and was told by a family member who is an  for a hospice agency in [place] that 24-hour bedside care will be provided by hospice.   Frankey Railing tells me that there are no family members who can take care of patient including her brother who is elderly and suffers from medical conditions of his own. Talked about financial resources for hiring private caregivers which Jessica Espinosa is not very excited about. 
-Hospice consult placed. Discussed with [facility] hospice liaison. Please call son Jeff Sibley at [phone] 3. Comfort oriented care in the meantime including comfort feeding 4. communicated plan of care with: Palliative IDTShashi 192 Team, primary RN 
 
 GOALS OF CARE / TREATMENT PREFERENCES:  
 
GOALS OF CARE: 
Patient/Health Care Proxy Stated Goals: Comfort TREATMENT PREFERENCES:  
Code Status: DNR Advance Care Planning: 
[x] The [facility] Interdisciplinary Team has updated the ACP Navigator with Annabella and Patient Capacity Primary Decision Maker (Active): Femi Cuellar - Unknown - [phone] Advance Care Planning 6/22/2020 Patient's Healthcare Decision Maker is: Named in scanned ACP document Confirm Advance Directive Yes, on file Advance Care Planning 6/22/2020 Patient's Healthcare Decision Maker is: Named in scanned ACP document Confirm Advance Directive Yes, on file Medical Interventions: Comfort measures Other Instructions: Other: As far as possible, the palliative care team has discussed with patient / health care proxy about goals of care / treatment preferences for patient. HISTORY:  
 
History obtained from: chart, son CHIEF COMPLAINT: found altered HPI/SUBJECTIVE: The patient is:  
[] Verbal and participatory [x] Non-participatory due to:  
  
Patient laying in bed comfortably, unable to provide any history. Clinical Pain Assessment (nonverbal scale for severity on nonverbal patients):  
Clinical Pain Assessment Severity: 0 Activity (Movement): Lying quietly, normal position Duration: for how long has pt been experiencing pain (e.g., 2 days, 1 month, years) Frequency: how often pain is an issue (e.g., several times per day, once every few days, constant) FUNCTIONAL ASSESSMENT:  
 
Palliative Performance Scale (PPS): PPS: 40 PSYCHOSOCIAL/SPIRITUAL SCREENING:  
 
Palliative IDT has assessed this patient for cultural preferences / practices and a referral made as appropriate to needs (Cultural Services, Patient Advocacy, Ethics, etc.) Any spiritual / Christianity concerns: 
[] Yes /  [x] No 
 
Caregiver Burnout: 
[] Yes /  [x] No /  [] No Caregiver Present Anticipatory grief assessment:  
[x] Normal  / [] Maladaptive ESAS Anxiety: ESAS Depression:    
 
 
 REVIEW OF SYSTEMS:  
 
Positive and pertinent negative findings in ROS are noted above in HPI. The following systems were [x] reviewed / [] unable to be reviewed as noted in HPI Other findings are noted below. Systems: constitutional, ears/nose/mouth/throat, respiratory, gastrointestinal, genitourinary, musculoskeletal, integumentary, neurologic, psychiatric, endocrine. Positive findings noted below. Modified ESAS Completed by: provider Pain: 0 Dyspnea: 0 PHYSICAL EXAM:  
 
From RN flowsheet: 
Wt Readings from Last 3 Encounters:  
06/23/20 126 lb (57.2 kg) 04/25/19 138 lb 14.2 oz (63 kg) Blood pressure 101/55, pulse 66, temperature 98.2 °F (36.8 °C), resp. rate 14, height 5' 11\" (1.803 m), weight 126 lb (57.2 kg), SpO2 96 %. Pain Scale 1: Visual 
Pain Intensity 1: 0 Last bowel movement, if known:  
 
Constitutional: Sleeping  
eyes: n/a 
ENMT: no nasal discharge, moist mucous membranes Cardiovascular: regular rhythm, distal pulses intact Respiratory: breathing not labored, symmetric Gastrointestinal: soft non-tender, +bowel sounds Musculoskeletal: Chronic disuse atrophy  
skin: warm, dry Neurologic: Unconscious HISTORY:  
 
Active Problems: 
  SDH (subdural hematoma) ([facility] [address] (6/22/2020) Past Medical History: Diagnosis Date  Altered awareness, transient  Cancer ([facility] [address] Left breast Ca, Bladder Ca Past Surgical History:  
Procedure Laterality Date  HX OTHER SURGICAL Tumor resection from left breast and bladder History reviewed. No pertinent family history. History reviewed, no pertinent family history. Social History Tobacco Use  Smoking status: Current Every Day Smoker  Smokeless tobacco: Never Used  Tobacco comment: Smokes a pipe Substance Use Topics  Alcohol use: Yes Alcohol/week: 14.0 standard drinks Types: 14 Shots of liquor per week No Known Allergies Current Facility-Administered Medications Medication Dose Route Frequency  albuterol-ipratropium (DUO-NEB) 2.5 MG-0.5 MG/3 ML  3 mL Nebulization Q6H RT  
 LORazepam (INTENSOL) 2 mg/mL oral concentrate 1 mg  1 mg Oral Q1H PRN  
 HYDROmorphone (DILAUDID) 1 mg/mL oral solution 2 mg  2 mg Oral Q1H PRN  
 ondansetron (ZOFRAN) injection 4 mg  4 mg IntraVENous Q6H PRN  
 naloxone (NARCAN) injection 0.4 mg  0.4 mg IntraVENous PRN  
 acetaminophen (TYLENOL) tablet 650 mg  650 mg Oral Q6H PRN  
 hydrALAZINE (APRESOLINE) 20 mg/mL injection 10 mg  10 mg IntraVENous Q6H PRN  
 
 
 
 LAB AND IMAGING FINDINGS:  
 
Lab Results Component Value Date/Time WBC 12.5 (H) 06/24/2020 05:16 AM  
 HGB 12.8 06/24/2020 05:16 AM  
 PLATELET 119 70/60/2278 05:16 AM  
 
Lab Results Component Value Date/Time Sodium 143 06/24/2020 05:16 AM  
 Potassium 3.8 06/24/2020 05:16 AM  
 Chloride 116 (H) 06/24/2020 05:16 AM  
 CO2 19 (L) 06/24/2020 05:16 AM  
 BUN 52 (H) 06/24/2020 05:16 AM  
 Creatinine 1.25 06/24/2020 05:16 AM  
 Calcium 8.8 06/24/2020 05:16 AM  
 Magnesium 2.4 04/24/2019 04:32 AM  
 Phosphorus 3.4 04/24/2019 04:32 AM  
  
Lab Results Component Value Date/Time Alk.  phosphatase 104 06/24/2020 05:16 AM  
 Protein, total 6.9 06/24/2020 05:16 AM  
 Albumin 2.9 (L) 06/24/2020 05:16 AM  
 Globulin 4.0 06/24/2020 05:16 AM  
 
Lab Results Component Value Date/Time INR 1.0 06/24/2020 05:16 AM  
 Prothrombin time 10.5 06/24/2020 05:16 AM  
 aPTT 28.6 06/24/2020 05:16 AM  
  
Lab Results Component Value Date/Time Ferritin 559 (H) 06/22/2020 02:04 PM  
  
No results found for: PH, PCO2, PO2 No components found for: [place] Lab Results Component Value Date/Time CK 2,057 (H) 06/24/2020 05:16 AM  
 CK - MB 6.9 (H) 11/16/2009 03:35 AM  
  
 
 
   
 
Total time: 50 minutes Counseling / coordination time, spent as noted above: 50 minutes 
> 50% counseling / coordination?:  Yes Prolonged service was provided for  []30 min   []75 min in face to face time in the presence of the patient, spent as noted above. Time Start:  
Time End:  
Note: this can only be billed with 62241 (initial) or 35758 (follow up). If multiple start / stop times, list each separately.

## 2020-06-25 NOTE — PROGRESS NOTES
Problem: Falls - Risk of 
Goal: *Absence of Falls Description: Document Checo Zev Fall Risk and appropriate interventions in the flowsheet. Outcome: Progressing Towards Goal 
Note: Fall Risk Interventions: 
Mobility Interventions: Bed/chair exit alarm, Communicate number of staff needed for ambulation/transfer Mentation Interventions: Bed/chair exit alarm, Door open when patient unattended Medication Interventions: Teach patient to arise slowly Elimination Interventions: Bed/chair exit alarm, Toileting schedule/hourly rounds History of Falls Interventions: Bed/chair exit alarm, Door open when patient unattended Problem: Patient Education: Go to Patient Education Activity Goal: Patient/Family Education Outcome: Progressing Towards Goal 
  
Problem: Pressure Injury - Risk of 
Goal: *Prevention of pressure injury Description: Document Xavi Scale and appropriate interventions in the flowsheet. Outcome: Progressing Towards Goal 
Note: Pressure Injury Interventions: 
Sensory Interventions: Assess changes in LOC, Avoid rigorous massage over bony prominences, Check visual cues for pain, Keep linens dry and wrinkle-free Moisture Interventions: Absorbent underpads, Minimize layers, Moisture barrier Activity Interventions: Pressure redistribution bed/mattress(bed type) Mobility Interventions: Pressure redistribution bed/mattress (bed type) Nutrition Interventions: Document food/fluid/supplement intake Friction and Shear Interventions: Apply protective barrier, creams and emollients, Minimize layers, Lift sheet

## 2020-06-25 NOTE — HOSPICE
[address] [place] [address] Good Help to Those in Need 
[phone] Patient Name: Michael Webber YOB: 1923 Age: 80 y.o. 53 [facility] RN Note:  Hospice consult received, reviewing chart. Will follow up with Unit Nurse and Care Manager to discuss plan of care, patient status and discharge disposition . Separate T/Cs to son, India Kim and daughter, Lorrie Choe who lives in [facility]. Hospice social worker, Arely BAXTERW present. Sal Couch is leaning toward placing patient in a medical group home as she is out of [place] and brother is unable to care for patient at home due to his own health issues. Elizabeth Gordillo has confirmed an available spot at a medical group home in [address]. Daughter Sal Couch aware that it is private pay and will discuss with brother, Kristen Garvey and get back with us today. 2:30-  
Family has approved admission to [address] [place] [address] in [place], [place].  
[address] [place] [address] 
[phone] Son, King Rendon will come by the hospital tomorrow to sign hospice consents. Admission scheduled for Saturday AM at 11 am. 
TIFFANY Pagan notified. She will be setting up transport for 9:30 am on Saturday. No DME needs at this time. Thank you for the opportunity to serve this patient and his family. Buster Guerrier RN MSN 
Nurse Liaison [address] [place] [address] [phone] ( mobile) [phone] ( office)

## 2020-06-25 NOTE — PROGRESS NOTES
Hospitalist Progress Note Hospital summary: Shaneka hCen is a 80 y.o. male who presents with fall 
  
Patient is extremely poor historian, I could not obtain much history from the patient. I tried calling patient's son Paty Simons on the telephone and left a voicemail and could not connect with him. History was primarily reviewed by chart review. Apparently patient had a fall yesterday and today. Patient lives all by himself. Was found on the floor today at approximately 8:30 AM.  Patient sure exactly what happened. Patient was not able able to bear weight and walk on his legs. Usually walks without assistance. Patient denies any other complaints or problems, although appears to be quite confused. Patient was brought to the hospital and was found to have a subdural hematoma. Patient was requested to be admitted under the hospitalist service. No further history could be obtained from the patient, no review of symptoms could be done due to his mental status. Per ER physician, the son reported to him that patient's mental status appeared to be  6/22/2020 Assessment/Plan: 
Acute on chronic Subdural hematoma S/p fall 
-  CT head: New, bilateral acute on chronic subdural hemorrhage. No herniation - Appreciate NSGY input 
- on Keppra for seizure ppx Acute metabolic encephalopathy - improved Underlying dementia 
- supportive care Traumatic Rhabdomyolysis: improving 
- serial CK checks - dced IVF due to concern for fluid overload SANDRA - likely sec to rhabdo + obstructive uropathy - improving 
- CT: Moderate right hydroureteronephrosis. Mild distention of the distal left ureter. - urology on board 
- alberto placed - dced abx as urine cx negative - will monitor renal function Leukocytosis  - improving 
- could be due to dehydration and rhabdo -  COVID-19 test negative - blood cx: NGTD 
- resp pcr negative - CXR: No acute cardiopulmonary disease - will monitor Elevated LFTs:  
- could be due to rhabdo 
- acute hepatitis panel negative 
-   right upper quadrant ultrasound: No acute abnormality is identified 
- will monitor Palliative care on board Hospice consulted Code status: DNR. Cj Navarro DVT prophylaxis: SCDs Disposition: TBD home hospice 
---------------------------------------------- 
 
CC: Fall S: Patient is seen and examined at bedside this morning. Discussion regarding hospice is going on. hospcie consulted yesterday. They will see the patient today As per previous hospitalist  
Able to reach son at 6pm. Updated patient's status and family is already in talks with hospice agency as outpatient. He agreed for hospice consult now. Comfort measures initiated. Review of Systems: 
Review of systems not obtained due to patient factors. O: 
Visit Vitals /55 (BP 1 Location: Right arm, BP Patient Position: At rest) Pulse 66 Temp 98.2 °F (36.8 °C) Resp 14 Ht 5' 11\" (1.803 m) Wt 57.2 kg (126 lb) SpO2 96% BMI 17.57 kg/m² PHYSICAL EXAM: 
Gen: NAD Neck: supple Heart: RRR, no MRG, no peripheral edema Lungs: CTA b/l, non-labored respirations Abd: soft, NT, ND, BS+ Extr: warm Skin: dry, no rash Neuro: alert and oriented x1 Intake/Output Summary (Last 24 hours) at 6/25/2020 1254 Last data filed at 6/25/2020 0454 Gross per 24 hour Intake  Output 550 ml Net -550 ml Recent labs & imaging reviewed: 
No results found for this or any previous visit (from the past 24 hour(s)). Recent Labs  
  06/24/20 
0516 06/23/20 
0440 WBC 12.5* 14.4* HGB 12.8 12.8 HCT 40.2 39.1  234 Recent Labs  
  06/24/20 
0516 06/23/20 
0208 06/22/20 
1404  142 140  
K 3.8 3.9 4.2 * 112* 110* CO2 19* 20* 21 BUN 52* 64* 61* CREA 1.25 1.47* 1.62* GLU 76 88 126* CA 8.8 9.4 10.2* Recent Labs  
  06/24/20 
0516 06/23/20 
0208 06/22/20 
1404 ALT 51 51 49  119* 127* TBILI 0.6 0.6 0.8 TP 6.9 6.6 7.8 ALB 2.9* 3.2* 3.6 GLOB 4.0 3.4 4.2* Recent Labs  
  06/24/20 
0516 06/23/20 
3347 INR 1.0 1.0 PTP 10.5 10.4 APTT 28.6 28.2 Recent Labs  
  06/22/20 
1404 FERR 559* No results found for: FOL, RBCF No results for input(s): PH, PCO2, PO2 in the last 72 hours. Recent Labs  
  06/24/20 
0516 06/23/20 
0208 06/22/20 
1404 CPK 2,057* 6,021* 6,935* TROIQ  --   --  0.06* Lab Results Component Value Date/Time Cholesterol, total 150 06/23/2020 02:08 AM  
 HDL Cholesterol 67 06/23/2020 02:08 AM  
 LDL, calculated 67 06/23/2020 02:08 AM  
 Triglyceride 80 06/23/2020 02:08 AM  
 CHOL/HDL Ratio 2.2 06/23/2020 02:08 AM  
 
No results found for: [facility] Lab Results Component Value Date/Time Color YELLOW/STRAW 06/22/2020 02:04 PM  
 Appearance CLOUDY (A) 06/22/2020 02:04 PM  
 Specific gravity 1.021 06/22/2020 02:04 PM  
 pH (UA) 5.0 06/22/2020 02:04 PM  
 Protein 30 (A) 06/22/2020 02:04 PM  
 Glucose Negative 06/22/2020 02:04 PM  
 Ketone Negative 06/22/2020 02:04 PM  
 Bilirubin Negative 06/22/2020 02:04 PM  
 Urobilinogen 0.2 06/22/2020 02:04 PM  
 Nitrites Negative 06/22/2020 02:04 PM  
 Leukocyte Esterase Negative 06/22/2020 02:04 PM  
 Epithelial cells FEW 06/22/2020 02:04 PM  
 Bacteria 1+ (A) 06/22/2020 02:04 PM  
 WBC 0-4 06/22/2020 02:04 PM  
 RBC 0-5 06/22/2020 02:04 PM  
 
 
Med list reviewed Current Facility-Administered Medications Medication Dose Route Frequency  albuterol-ipratropium (DUO-NEB) 2.5 MG-0.5 MG/3 ML  3 mL Nebulization Q6H RT  
 LORazepam (INTENSOL) 2 mg/mL oral concentrate 1 mg  1 mg Oral Q1H PRN  
 HYDROmorphone (DILAUDID) 1 mg/mL oral solution 2 mg  2 mg Oral Q1H PRN  
 ondansetron (ZOFRAN) injection 4 mg  4 mg IntraVENous Q6H PRN  
 naloxone (NARCAN) injection 0.4 mg  0.4 mg IntraVENous PRN  
 acetaminophen (TYLENOL) tablet 650 mg  650 mg Oral Q6H PRN  
  hydrALAZINE (APRESOLINE) 20 mg/mL injection 10 mg  10 mg IntraVENous Q6H PRN Care Plan discussed with:  Patient/Family and Nurse Petra Guevara MD 
Internal Medicine Date of Service: 6/25/2020

## 2020-06-25 NOTE — HOSPICE
[facility] Good Help to Those in Need 
[phone] Patient Name: Ning Deluna YOB: 1923 Age: 80 y.o. [facility] L[facility] Note:  Hospice consult noted. Chart reviewed. Plan of care discussed with patients care manager. This LCSW, and Francoise Madrid RN had multiple phone calls with pts son Alan Stewart ([phone]), who lives locally and daughter Alberto Blackmon ( [phone]), who lives in [place], about hospice services. Discussed Hospice philosophy, general plan of care, levels of care, services and on call procedures. Family is seeking LTC due to pts increased care needs and son cannot care for pt 24/7 in the home. [facility]. Pt will be going to [address] [place] [address] in [place], [place] at [place] [phone]. Miss Roslyn Han is the proprietor/RN  ( [phone] H/ [phone]). [facility] updated CM Yvon Hernandez re disposition. Transportation has been arranged for Saturday 6/27/2020 at 9:30 am with hospice admission scheduled for 11 am at the group home. Hospice team will work with son to get hospice consents signed tomorrow Friday 6/26/2020. Pt is DNR, there is not a DDNR on chart. Family reports pt is DDNR, they will bring to hospital when consents are signed. Thank you for the opportunity to be of service to Mr. Chela Ramirez and his family. Arely Ray [facility], MSG [facility] [phone]

## 2020-06-25 NOTE — ROUTINE PROCESS
Bedside shift change report given to [address] [place] [address] Line Keshawn S (oncoming nurse) by Tayler Red RN (offgoing nurse). Report included the following information SBAR and Kardex.

## 2020-06-25 NOTE — PROGRESS NOTES
Seen and examined for a consult on 06/23 by Asim Gresham NP for Moderate R hydro. Patient admitted for subdural hematoma Per Hospitalist note: Explained regarding patient's status - acute on chronic SDH and fall and rhabdomyolysis. He understand his father is at his last stage. He states that is father has DNR paper work. He and his sister were already arranging for hospice care at home. He agreed for hospice consult in the hospital. Comfort measures initiated. afvss WBC: 12.5 Cr: 1.25 
UA: 1+ bacteria Alberto placed on 06/23: UOP 1025cc CT Spine Lumb WO: 
IMPRESSION: 
  
1. No acute abnormality. 2. Prior laminectomies at L5-S1. 
3. Spondylosis as above. 4. 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 5. Massively distended bladder. 6. Moderate right hydroureteronephrosis. Mild distention of the distal left 
ureter. The ureterovesicular junctions are not completely imaged. Several small 
nonobstructive stones are seen in the right kidney. PLAN: 
Comfort measures taken and hospice consulted. If hospitalist agrees with plan, CT abd/pelv to reassess hydro post alberto insertion on 06/23

## 2020-06-25 NOTE — PROGRESS NOTES
Occupational Therapy Patient chart reviewed in prep for OT treatment session. Pt family has decided to transition to hospice and comfort care. Will complete OT orders at this time. Thank you.  
Samantha Kenny, OTR/L

## 2020-06-25 NOTE — PROGRESS NOTES
BRICE Plan 
-RUR 8% 
-Noted Hospice consult -[facility],Accpted-Hospice Admission Saturday 6/27 
-Hospice to be provided at [address] 
-Referral sent to [facility] to screen for [facility]/LTC 
-[facility] arranged for Sat 6/27 @ 9:30 am, confirmed CM noted consult for hospice. CM called patients son discussed hospice. He is interested in [address] [place] [address] for Home Hospice. CM sent referral via cc link to [facility]. Patient son advised that he would like for the patient to be screened for Medicaid/LTC to help with caregivers. The patient son and daughter will cover cost for 24/7caregivers until Medicaid is available if applicable. CM sent referral to [facility] to screen for LTC/[facility]. CM spoke with Arely, hospice liaison who advised she is aware of patient and will follow up with CM with updates of progress. CM received update of plan for patient. Patient will receive hospice services at [address] [facility]. CM arranged transport via [facility] for Saturday 6/27 @ 9:30 am. Ambulance form is on the chart. CM will monitor SABA Nagy/JAIME

## 2020-06-25 NOTE — PROGRESS NOTES
Pt noted to be hospice, will DC therapy at this time but we are available to answer questions as needed Erik Barriga, ALESSIOT, PT

## 2020-06-26 NOTE — PROGRESS NOTES
NUTRITION Brief Note: 
 
Chart reviewed, noted plans for Hospice care. RD will sign off, but if our services are needed at any point please consult us. Thank you. Fatuma Merlos RD, CSP 
C: [phone]

## 2020-06-26 NOTE — HOSPICE
Met with son, Kenna Mason today to sign consents for hospice admission tomorrow. Picked up patient's symptom relief kit from the [facility] and delivered to unit. They will be storing medication for the patient overnight and it will travel with him tomorrow in transport to [address] [place] [address]. Thank you for the opportunity to serve this patient and his family. Lina Paulson RN MSN 
Nurse Liaison [facility] [phone] ( [place]) [phone] ( office)

## 2020-06-26 NOTE — PROGRESS NOTES
Hospitalist Progress Note Hospital summary: Colton Campuzano is a 80 y.o. male who presents with fall 
  
Patient is extremely poor historian, I could not obtain much history from the patient. I tried calling patient's son Myron Butcher on the telephone and left a voicemail and could not connect with him. History was primarily reviewed by chart review. Apparently patient had a fall yesterday and today. Patient lives all by himself. Was found on the floor today at approximately 8:30 AM.  Patient sure exactly what happened. Patient was not able able to bear weight and walk on his legs. Usually walks without assistance. Patient denies any other complaints or problems, although appears to be quite confused. Patient was brought to the hospital and was found to have a subdural hematoma. Patient was requested to be admitted under the hospitalist service. No further history could be obtained from the patient, no review of symptoms could be done due to his mental status. Per ER physician, the son reported to him that patient's mental status appeared to be  6/22/2020 Assessment/Plan: 
Acute on chronic Subdural hematoma S/p fall 
-  CT head: New, bilateral acute on chronic subdural hemorrhage. No herniation - Appreciate NSGY input Acute metabolic encephalopathy - improved Underlying dementia, unclear baseline 
metabolic encephalopathy with underlying dementia  (CDMP 
- supportive care Traumatic Rhabdomyolysis: improving 
- serial CK checks - dced IVF due to concern for fluid overload SANDRA - likely sec to rhabdo + obstructive uropathy - improving 
- CT: Moderate right hydroureteronephrosis. Mild distention of the distal left ureter. - urology on board 
- alberto placed - dced abx as urine cx negative Leukocytosis  - improving 
- could be due to dehydration and rhabdo -  COVID-19 test negative - blood cx: NGTD 
- resp pcr negative - CXR: No acute cardiopulmonary diseaser Elevated LFTs:  
- could be due to rhabdo 
- acute hepatitis panel negative 
-   right upper quadrant ultrasound: No acute abnormality is identified 
- will monitor Palliative care on board Hospice consulted  
comfort measures Code status: DNR. Cj Jaquez DVT prophylaxis: SCDs Disposition: TBD home hospice 
---------------------------------------------- 
 
CC: Fall S: Patient is seen and examined at bedside this morning. Discussion regarding hospice is going on. hospcie consulted yesterday. Plan for hospice in a group home as per palliative. Waiting for availability Review of Systems: 
Review of systems not obtained due to patient factors. O: 
Visit Vitals /73 Pulse 79 Temp 97.5 °F (36.4 °C) Resp 18 Ht 5' 11\" (1.803 m) Wt 57.2 kg (126 lb) SpO2 95% BMI 17.57 kg/m² PHYSICAL EXAM: 
Gen: NAD Neck: supple Heart: RRR, no MRG, no peripheral edema Lungs: CTA b/l, non-labored respirations Abd: soft, NT, ND, BS+ Extr: warm Skin: dry, no rash Neuro: alert and oriented x1 Intake/Output Summary (Last 24 hours) at 6/26/2020 1357 Last data filed at 6/26/2020 3496 Gross per 24 hour Intake  Output 600 ml Net -600 ml Recent labs & imaging reviewed: 
No results found for this or any previous visit (from the past 24 hour(s)). Recent Labs  
  06/24/20 
8359 WBC 12.5* HGB 12.8 HCT 40.2  Recent Labs  
  06/24/20 
1494   
K 3.8 * CO2 19* BUN 52* CREA 1.25  
GLU 76  
CA 8.8 Recent Labs  
  06/24/20 
1929 ALT 51  TBILI 0.6 TP 6.9 ALB 2.9*  
GLOB 4.0 Recent Labs  
  06/24/20 
4805 INR 1.0 PTP 10.5 APTT 28.6 No results for input(s): FE, TIBC, PSAT, FERR in the last 72 hours. No results found for: FOL, RBCF No results for input(s): PH, PCO2, PO2 in the last 72 hours. Recent Labs  
  06/24/20 
9573 CPK 2,057* Lab Results Component Value Date/Time Cholesterol, total 150 06/23/2020 02:08 AM  
 HDL Cholesterol 67 06/23/2020 02:08 AM  
 LDL, calculated 67 06/23/2020 02:08 AM  
 Triglyceride 80 06/23/2020 02:08 AM  
 CHOL/HDL Ratio 2.2 06/23/2020 02:08 AM  
 
No results found for: [facility] Lab Results Component Value Date/Time Color YELLOW/STRAW 06/22/2020 02:04 PM  
 Appearance CLOUDY (A) 06/22/2020 02:04 PM  
 Specific gravity 1.021 06/22/2020 02:04 PM  
 pH (UA) 5.0 06/22/2020 02:04 PM  
 Protein 30 (A) 06/22/2020 02:04 PM  
 Glucose Negative 06/22/2020 02:04 PM  
 Ketone Negative 06/22/2020 02:04 PM  
 Bilirubin Negative 06/22/2020 02:04 PM  
 Urobilinogen 0.2 06/22/2020 02:04 PM  
 Nitrites Negative 06/22/2020 02:04 PM  
 Leukocyte Esterase Negative 06/22/2020 02:04 PM  
 Epithelial cells FEW 06/22/2020 02:04 PM  
 Bacteria 1+ (A) 06/22/2020 02:04 PM  
 WBC 0-4 06/22/2020 02:04 PM  
 RBC 0-5 06/22/2020 02:04 PM  
 
 
Med list reviewed Current Facility-Administered Medications Medication Dose Route Frequency  albuterol-ipratropium (DUO-NEB) 2.5 MG-0.5 MG/3 ML  3 mL Nebulization Q6H RT  
 LORazepam (INTENSOL) 2 mg/mL oral concentrate 1 mg  1 mg Oral Q1H PRN  
 HYDROmorphone (DILAUDID) 1 mg/mL oral solution 2 mg  2 mg Oral Q1H PRN  
 ondansetron (ZOFRAN) injection 4 mg  4 mg IntraVENous Q6H PRN  
 naloxone (NARCAN) injection 0.4 mg  0.4 mg IntraVENous PRN  
 acetaminophen (TYLENOL) tablet 650 mg  650 mg Oral Q6H PRN  
 hydrALAZINE (APRESOLINE) 20 mg/mL injection 10 mg  10 mg IntraVENous Q6H PRN Care Plan discussed with:  Patient/Family and Nurse Felipe Ordonez MD 
Internal Medicine Date of Service: 6/26/2020

## 2020-06-26 NOTE — PROGRESS NOTES
Palliative Medicine Consult Aaron: [phone]) Patient Name: Lukas Vasquez YOB: 1923 Date of Initial Consult: 6/23/2020 Reason for Consult: care decisions Requesting Provider: Dr. Yola Guthrie Primary Care Physician: Dwana Romberg, MD 
 
 SUMMARY:  
Lukas Vasquez is a 80 y.o. with a past history of early dementia still independent in ADLs, living alone, resection of bladder tumor 2009, BPH, who was admitted on 6/22/2020 after found by EMS  with a diagnosis of AMS . Current medical issues leading to Palliative Medicine involvement include: Acute on chronic subdural hematoma, patient unresponsive Patient lives in his own home with in law suite adjoined that has tenant/friend who checks on patient and also family nearby -- Son Cliff Glez (lives in [facility]) and checks on his dad frequently. 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  
1. Delirium, metabolic encephalopathy 2. Patient does not demonstrate decision making capacity at this time. 3. Early dementia 4. Tobacco use 5. Daily alcohol use (3 bourbons and water each night) PLAN:  
1. Patient is nonverbal and unable to provide any history, appears more alert and awake today. 2. Discussed with hospitalist, thankfully, there is a group home which is ready to accept patient and family is agreeable to the same. Followed up with son, provided updates including prognosis, answered all his questions to his apparent satisfaction. 3.  Comfort oriented care in the meantime including comfort feeding 4. communicated plan of care with: Palliative [facility] Team, primary RN 
 
 GOALS OF CARE / TREATMENT PREFERENCES:  
 
GOALS OF CARE: 
Patient/Health Care Proxy Stated Goals: Comfort TREATMENT PREFERENCES:  
Code Status: DNR 
 
 Advance Care Planning: 
[x] [facility] Interdisciplinary Team has updated the ACP Navigator with Annabella and Patient Capacity Primary Decision Maker (Active): Femi Cuellar - [place] - [phone] Advance Care Planning 6/22/2020 Patient's Healthcare Decision Maker is: Named in scanned ACP document Confirm Advance Directive Yes, on file Advance Care Planning 6/22/2020 Patient's Healthcare Decision Maker is: Named in scanned ACP document Confirm Advance Directive Yes, on file Medical Interventions: Comfort measures Other Instructions: Other: As far as possible, the palliative care team has discussed with patient / health care proxy about goals of care / treatment preferences for patient. HISTORY:  
 
History obtained from: chart, son CHIEF COMPLAINT: found altered HPI/SUBJECTIVE: The patient is:  
[] Verbal and participatory [x] Non-participatory due to:  
  
Patient laying in bed comfortably, unable to provide any history. Clinical Pain Assessment (nonverbal scale for severity on nonverbal patients):  
Clinical Pain Assessment Severity: 0 Activity (Movement): Lying quietly, normal position Duration: for how long has pt been experiencing pain (e.g., 2 days, 1 month, years) Frequency: how often pain is an issue (e.g., several times per day, once every few days, constant) FUNCTIONAL ASSESSMENT:  
 
Palliative Performance Scale (PPS): PPS: 40 PSYCHOSOCIAL/SPIRITUAL SCREENING:  
 
Palliative IDT has assessed this patient for cultural preferences / practices and a referral made as appropriate to needs (Cultural Services, Patient Advocacy, Ethics, etc.) Any spiritual / Orthodox concerns: 
[] Yes /  [x] No 
 
Caregiver Burnout: 
[] Yes /  [x] No /  [] No Caregiver Present Anticipatory grief assessment:  
[x] Normal  / [] Maladaptive ESAS Anxiety: ESAS Depression:    
 
 
 REVIEW OF SYSTEMS:  
 
 Positive and pertinent negative findings in ROS are noted above in HPI. The following systems were [x] reviewed / [] unable to be reviewed as noted in HPI Other findings are noted below. Systems: constitutional, ears/nose/mouth/throat, respiratory, gastrointestinal, genitourinary, musculoskeletal, integumentary, neurologic, psychiatric, endocrine. Positive findings noted below. Modified ESAS Completed by: provider Pain: 0 Dyspnea: 0 PHYSICAL EXAM:  
 
From RN flowsheet: 
Wt Readings from Last 3 Encounters:  
06/23/20 126 lb (57.2 kg) 04/25/19 138 lb 14.2 oz (63 kg) Blood pressure 137/72, pulse 74, temperature 98 °F (36.7 °C), resp. rate 16, height 5' 11\" (1.803 m), weight 126 lb (57.2 kg), SpO2 95 %. Pain Scale 1: Numeric (0 - 10) Pain Intensity 1: 0 Last bowel movement, if known:  
 
Constitutional: Sleeping  
eyes: n/a 
ENMT: no nasal discharge, moist mucous membranes Cardiovascular: regular rhythm, distal pulses intact Respiratory: breathing not labored, symmetric Gastrointestinal: soft non-tender, +bowel sounds Musculoskeletal: Chronic disuse atrophy  
skin: warm, dry Neurologic: Unconscious HISTORY:  
 
Active Problems: 
  SDH (subdural hematoma) ([facility] [address] (6/22/2020) Past Medical History:  
Diagnosis Date  Altered awareness, transient  Cancer ([facility] [address] Left breast Ca, Bladder Ca Past Surgical History:  
Procedure Laterality Date  HX OTHER SURGICAL Tumor resection from left breast and bladder History reviewed. No pertinent family history. History reviewed, no pertinent family history. Social History Tobacco Use  Smoking status: Current Every Day Smoker  Smokeless tobacco: Never Used  Tobacco comment: Smokes a pipe Substance Use Topics  Alcohol use: Yes Alcohol/week: 14.0 standard drinks Types: 14 Shots of liquor per week No Known Allergies Current Facility-Administered Medications Medication Dose Route Frequency  albuterol-ipratropium (DUO-NEB) 2.5 MG-0.5 MG/3 ML  3 mL Nebulization Q6H RT  
 LORazepam (INTENSOL) 2 mg/mL oral concentrate 1 mg  1 mg Oral Q1H PRN  
 HYDROmorphone (DILAUDID) 1 mg/mL oral solution 2 mg  2 mg Oral Q1H PRN  
 ondansetron (ZOFRAN) injection 4 mg  4 mg IntraVENous Q6H PRN  
 naloxone (NARCAN) injection 0.4 mg  0.4 mg IntraVENous PRN  
 acetaminophen (TYLENOL) tablet 650 mg  650 mg Oral Q6H PRN  
 hydrALAZINE (APRESOLINE) 20 mg/mL injection 10 mg  10 mg IntraVENous Q6H PRN  
 
 
 
 LAB AND IMAGING FINDINGS:  
 
Lab Results Component Value Date/Time WBC 12.5 (H) 06/24/2020 05:16 AM  
 HGB 12.8 06/24/2020 05:16 AM  
 PLATELET 026 49/63/3865 05:16 AM  
 
Lab Results Component Value Date/Time Sodium 143 06/24/2020 05:16 AM  
 Potassium 3.8 06/24/2020 05:16 AM  
 Chloride 116 (H) 06/24/2020 05:16 AM  
 CO2 19 (L) 06/24/2020 05:16 AM  
 BUN 52 (H) 06/24/2020 05:16 AM  
 Creatinine 1.25 06/24/2020 05:16 AM  
 Calcium 8.8 06/24/2020 05:16 AM  
 Magnesium 2.4 04/24/2019 04:32 AM  
 Phosphorus 3.4 04/24/2019 04:32 AM  
  
Lab Results Component Value Date/Time Alk. phosphatase 104 06/24/2020 05:16 AM  
 Protein, total 6.9 06/24/2020 05:16 AM  
 Albumin 2.9 (L) 06/24/2020 05:16 AM  
 Globulin 4.0 06/24/2020 05:16 AM  
 
Lab Results Component Value Date/Time INR 1.0 06/24/2020 05:16 AM  
 Prothrombin time 10.5 06/24/2020 05:16 AM  
 aPTT 28.6 06/24/2020 05:16 AM  
  
Lab Results Component Value Date/Time Ferritin 559 (H) 06/22/2020 02:04 PM  
  
No results found for: PH, PCO2, PO2 No components found for: [place] Lab Results Component Value Date/Time CK 2,057 (H) 06/24/2020 05:16 AM  
 CK - MB 6.9 (H) 11/16/2009 03:35 AM  
  
 
 
   
 
Total time: 15 minutes Counseling / coordination time, spent as noted above: 15 minutes 
> 50% counseling / coordination?:  Yes 
 
 Prolonged service was provided for  []30 min   []75 min in face to face time in the presence of the patient, spent as noted above. Time Start:  
Time End:  
Note: this can only be billed with 37807 (initial) or 51329 (follow up). If multiple start / stop times, list each separately.

## 2020-06-26 NOTE — PROGRESS NOTES
Patient was discontinued from Physical Therapy yesterday but the order was not completed. Will complete the order today.

## 2020-06-27 NOTE — HOSPICE
[facility] RN Note: 
 
6069 Patient seen at the bedside, patient is pleasantly confused and smiling. Looked at patient's chart, saw DDNR, hospice consents, and [facility] form. Noted medications are locked up with pharmacy, will make sure they are sent with patient. 65 Spoke with primary RN, stated she is planning on going down to pharmacy to  symptom relief kit before discharge. Will continue to monitor. 36 Spoke with primary RN, [facility] called [facility] and asked for discharge be pushed back to 1130 due to low staffing on their end. Notified admission nurse Bhaskar Bishop of change. Will continue to monitor.

## 2020-06-27 NOTE — HOSPICE
[facility] 
[phone] Inpatient Nursing PRE HOME Admission Patient Name: Lukas Vasquez YOB: 1923 Age: 80 y.o. Date of PLANNED Hospice Admission: 6/27/20 Hospice Attending: Dr. Dillon Quiroz Primary Care Physician: Dwana Romberg, MD 
  
Home Hospice Region: [facility] Expected  TBD HOSPICE SUMMARY  
ER Visits/ Hospitalizations in past year: One ED visit and one inpatient visit Hospice Diagnosis: Subdural hematoma Onset Date of Hospice Diagnosis: 6/27/20 Co-Morbidities:  
Patient Active Problem List  
Diagnosis Code  SDH (subdural hematoma) ([facility] [address] H07.0F4X Diagnoses RELATED to the terminal prognosis: Subdural hematoma Other Diagnoses: see above Rationale for a prognosis of life expectancy of 6 months or less if the disease follows its normal course (Disease Specific History):  
 
Lukas Vasquez is a 80y.o. year old was referred to [facility]. The patient's principle diagnosis of subdural hematoma has resulted in this hospitalization where patient declined, caregiver unable to take care of patient anymore. Functionally, the patient's Palliative Performance Scale has declined over a period of weeks and is estimated at 30. Objective information that support this patients limited prognosis includes: . The patient/family chose comfort measures with the support of Hospice. Prognosis estimated based on 06/27/20 clinical assessment is:  
[] Few to Many Hours [] Hours to Days  
[] Few to Many Days  
[] Days to Weeks  
[] Few to Many Weeks [x] Weeks to Months  
[] Few to Many Months ADVANCE CARE PLANNING (Complete in ACP Flow Sheet) Code Status: DNR Durable DNR: [x] Yes  [] No 
Advance Care Planning 6/22/2020 Patient's Healthcare Decision Maker is: Named in scanned ACP document Confirm Advance Directive Yes, on file  Service: [] Yes  [] No      [x] Unknown ASSESSMENT SYMPTOMS: restlessness, confusion ESAS:  
Time of Assessment: 0805 Pain (1-10):1 Shortness of breath (1-10):1 Nausea (1-10): 1 Constipation: [] Yes  [x] No 
 
FALL RISK:  [] Low   [x] Moderate    [] High    [] Not able to assess KARNOFSKY: 30 
 
PRESSURE ULCERS Xavi Scale (pressure ulcer risk): 15 Progression to DEPENDENCE WITH ADLs (include time frame): Patient has become increasingly dependent on all ADL's since this admission, caregiver [x] Dependent for bathing: personal hygiene and grooming  
[x] Dependent for dressing: dressing and undressing  
[x] Dependent for transferring: movement and mobility [x] Dependent for toileting: continence-related tasks including control and hygiene [x] Dependent for eating: preparing food and feeding CLINICAL INFORMATION Mid-arm Circumference (MAC):     
Last 3 Recorded Weights in this Encounter 06/22/20 1956 06/23/20 9935 06/23/20 1720 Weight: 57.2 kg (126 lb 0.9 oz) 57.2 kg (126 lb) 57.2 kg (126 lb) Body mass index is 17.57 kg/m². Visit Vitals /65 (BP 1 Location: Left arm, BP Patient Position: At rest) Pulse 63 Temp 98 °F (36.7 °C) Resp 18 Ht 5' 11\" (1.803 m) Wt 57.2 kg (126 lb) SpO2 96% BMI 17.57 kg/m² Currently this patient has: 
[] Supplemental O2 [] Peripheral IV [] PICC    [] PORT [x] Rocha Catheter [] NG Tube   [] PEG Tube  [] Ostomy   
 
[] AICD: Has ICD been deactivated? [] Yes   [] No:_____ Wt Readings from Last 3 Encounters:  
06/23/20 57.2 kg (126 lb) 04/25/19 63 kg (138 lb 14.2 oz) Visit Vitals /65 (BP 1 Location: Left arm, BP Patient Position: At rest) Pulse 63 Temp 98 °F (36.7 °C) Resp 18 Ht 5' 11\" (1.803 m) Wt 57.2 kg (126 lb) SpO2 96% BMI 17.57 kg/m² SIGNS/PHYSICAL FINDINGS: Restlessness, AMS 
 
LAB VALUES No results found for this visit on 06/22/20 (from the past 12 hour(s)). No results found for this visit on 06/22/20 (from the past 6 hour(s)). Lab Results Component Value Date/Time Protein, total 6.9 2020 05:16 AM  
 Albumin 2.9 (L) 2020 05:16 AM  
 
 
ALLERGIES AND MEDICATIONS Allergies: No Known Allergies Current Facility-Administered Medications Medication Dose Route Frequency  albuterol-ipratropium (DUO-NEB) 2.5 MG-0.5 MG/3 ML  3 mL Nebulization Q6H RT  
 LORazepam (INTENSOL) 2 mg/mL oral concentrate 1 mg  1 mg Oral Q1H PRN  
 HYDROmorphone (DILAUDID) 1 mg/mL oral solution 2 mg  2 mg Oral Q1H PRN  
 ondansetron (ZOFRAN) injection 4 mg  4 mg IntraVENous Q6H PRN  
 naloxone (NARCAN) injection 0.4 mg  0.4 mg IntraVENous PRN  
 acetaminophen (TYLENOL) tablet 650 mg  650 mg Oral Q6H PRN  
 hydrALAZINE (APRESOLINE) 20 mg/mL injection 10 mg  10 mg IntraVENous Q6H PRN  
 
 
ASSESSMENT & PLAN 1. Symptom Issues Identified: None identified at this time 2. Spiritual Issues Identified: None identified at this time, home team to follow as needed and  to follow as needed. 3.  Psych/ Social/ Emotional Issues Identified: None identified at this time, home to follow as needed. 4.  Care Coordination:  
[facility] Report given to: intake Transportation by: [facility] Scheduled for 1045 Medications Needs: Symptom relief kit ordered by AKIKO Pitts, to go home with patient. DME: none needed Supplies: None needed  Home: Unknown

## 2020-06-27 NOTE — PROGRESS NOTES
TRANSFER - OUT REPORT: 
 
Verbal report given to Rosa Clifton(name) on Elmo Alfred  being transferred to [address] [place] [address] (hospice)(unit) for routine progression of care Report consisted of patients Situation, Background, Assessment and  
Recommendations(SBAR). Information from the following report(s) SBAR was reviewed with the receiving nurse. Lines:    
 
Opportunity for questions and clarification was provided. Patient transported with: 
 [email or web address]

## 2020-07-02 NOTE — CDMP QUERY
The diagnosis of encephalopathy has been documented for this patient who has a history of dementia. Additional documentation is needed to support the diagnosis of encephalopathy in the setting of dementia. Please include this documentation in the progress notes and discharge summary. -metabolic encephalopathy with underlying dementai supported (please provide additional documentation) -metabolic encephalopathy not supported. Patient with underlying dementia was at baseline at admission. -other 
 
-unable to determine Current Documentation States:  
 
Patient admitted for SDH s/p fall at home. History of dementia on the record. Metabolic encephalopathy on the record. Patient was being treated for rhabdo, SANDRA, and dehydration. ER nurse mentions that patient is at baseline. ER MD does not mention anything in his findings. [facility]  H&P \"Patient is extremely poor historian, I could not obtain much history from the patient Per ER physician, the son reported to him that patient's mental status appeared to be baseline\" General : alert x 1, frail, elderly male, appears disheveled and confused [facility] PT \"Rn reports pt is not following commands, is pulling at lines, and is not appropriate for PT eval at this time\" Palliative Patient is nonverbal and unable to provide any history Collette You Acute metabolic encephalopathy - improved Underlying dementia - pt appears at his baseline today Patient is seen and examined at bedside this morning. Alert, oriented to name, much more interactive -  appears at his baseline 
- supportive care Patient normally lives on his own and walks independently Best Practice Documentation Includes: 
* What is the patient's baseline mental status, and how does it compare to current mental status? * Have the patient's behaviors changed? Are they experiencing any hallucinations, sensory misperceptions, hypervigilance, disturbed sleep/wake cycle, or paranoia? * Is the patient requiring a level of nursing care or medications that have been increased since their admission? * Is the patient experiencing any new motor abnormalities (tremor, asterixis, myoclonus, etc.)

## 2020-07-04 ENCOUNTER — HOME CARE VISIT (OUTPATIENT)
Dept: HOSPICE | Facility: HOSPICE | Age: 85
End: 2020-07-04
Payer: MEDICARE